# Patient Record
Sex: MALE | Race: WHITE
[De-identification: names, ages, dates, MRNs, and addresses within clinical notes are randomized per-mention and may not be internally consistent; named-entity substitution may affect disease eponyms.]

---

## 2018-09-08 ENCOUNTER — HOSPITAL ENCOUNTER (INPATIENT)
Dept: HOSPITAL 55 - ED | Age: 73
Discharge: HOME | DRG: 305 | End: 2018-09-08
Attending: FAMILY MEDICINE | Admitting: FAMILY MEDICINE
Payer: MEDICARE

## 2018-09-08 VITALS — HEART RATE: 70 BPM | RESPIRATION RATE: 26 BRPM

## 2018-09-08 VITALS — SYSTOLIC BLOOD PRESSURE: 130 MMHG | TEMPERATURE: 98 F | DIASTOLIC BLOOD PRESSURE: 76 MMHG

## 2018-09-08 VITALS — OXYGEN SATURATION: 93 %

## 2018-09-08 DIAGNOSIS — I48.91: ICD-10-CM

## 2018-09-08 DIAGNOSIS — R06.03: ICD-10-CM

## 2018-09-08 DIAGNOSIS — I10: ICD-10-CM

## 2018-09-08 DIAGNOSIS — J44.1: ICD-10-CM

## 2018-09-08 DIAGNOSIS — F06.4: ICD-10-CM

## 2018-09-08 DIAGNOSIS — R06.02: Primary | ICD-10-CM

## 2018-09-08 LAB
BASOPHILS NFR BLD AUTO: 1 % (ref 0–3)
BUN SERPL-MCNC: 20 MG/DL (ref 7–18)
CALCIUM SERPL-MCNC: 8 MG/DL (ref 8.5–10.1)
CHLORIDE SERPL-SCNC: 104 MMOL/L (ref 98–107)
CK SERPL-CCNC: 153 U/L (ref 39–308)
CO2 BLDA CALC-SCNC: 32 MEQ/L (ref 23–30)
CO2 SERPL-SCNC: 31.9 MEQ/L (ref 21–32)
CREAT SERPL-MCNC: 1.12 MG/DL (ref 0.8–1.3)
EOSINOPHIL NFR BLD AUTO: 0 % (ref 0–9)
GLUCOSE SERPL-MCNC: 122 MG/DL (ref 74–106)
HCO3 BLDA-SCNC: 31 MEQ/L (ref 23–30)
HCT VFR BLD CALC: 44 % (ref 39–53)
HGB BLD-MCNC: 14 GM/DL (ref 13.5–17.7)
INR PPP: 0.96 (ref 0.86–1.12)
LYMPHOCYTES NFR BLD AUTO: 18.5 % (ref 10–50)
MCH RBC QN AUTO: 28.1 PG (ref 27–32)
MCHC RBC AUTO-ENTMCNC: 31.7 GM/DL (ref 32–36)
MCV RBC AUTO: 89 FL (ref 80–100)
MONOCYTES NFR BLD AUTO: 8.8 % (ref 0–12)
NEUTROPHILS NFR BLD AUTO: 71.7 % (ref 37–80)
PCO2 BLDA: 55 MMHG (ref 35–45)
PH BLDA: 7.36 [PH] (ref 7.35–7.45)
PO2 BLDA: 54 MMHG (ref 80–100)
POTASSIUM SERPL-SCNC: 4.3 MMOL/L (ref 3.5–5.1)
SAO2 % BLDA FROM PO2: 85 % (ref 96–100)
SODIUM SERPL-SCNC: 142 MMOL/L (ref 136–145)
TROPONIN I SERPL-MCNC: 0.02 NG/ML (ref 0–0.06)

## 2018-09-08 PROCEDURE — 85730 THROMBOPLASTIN TIME PARTIAL: CPT

## 2018-09-08 PROCEDURE — 36600 WITHDRAWAL OF ARTERIAL BLOOD: CPT

## 2018-09-08 PROCEDURE — 82550 ASSAY OF CK (CPK): CPT

## 2018-09-08 PROCEDURE — 96374 THER/PROPH/DIAG INJ IV PUSH: CPT

## 2018-09-08 PROCEDURE — 94660 CPAP INITIATION&MGMT: CPT

## 2018-09-08 PROCEDURE — 94640 AIRWAY INHALATION TREATMENT: CPT

## 2018-09-08 PROCEDURE — 84484 ASSAY OF TROPONIN QUANT: CPT

## 2018-09-08 PROCEDURE — 85610 PROTHROMBIN TIME: CPT

## 2018-09-08 PROCEDURE — 83880 ASSAY OF NATRIURETIC PEPTIDE: CPT

## 2018-09-08 PROCEDURE — 96375 TX/PRO/DX INJ NEW DRUG ADDON: CPT

## 2018-09-08 PROCEDURE — 71045 X-RAY EXAM CHEST 1 VIEW: CPT

## 2018-09-08 PROCEDURE — 36415 COLL VENOUS BLD VENIPUNCTURE: CPT

## 2018-09-08 PROCEDURE — 93005 ELECTROCARDIOGRAM TRACING: CPT

## 2018-09-08 PROCEDURE — 99236 HOSP IP/OBS SAME DATE HI 85: CPT

## 2018-09-08 PROCEDURE — 94760 N-INVAS EAR/PLS OXIMETRY 1: CPT

## 2018-09-08 PROCEDURE — 85025 COMPLETE CBC W/AUTO DIFF WBC: CPT

## 2018-09-08 PROCEDURE — 82803 BLOOD GASES ANY COMBINATION: CPT

## 2018-09-08 PROCEDURE — 99285 EMERGENCY DEPT VISIT HI MDM: CPT

## 2018-09-08 PROCEDURE — 80048 BASIC METABOLIC PNL TOTAL CA: CPT

## 2018-09-08 RX ADMIN — METHYLPREDNISOLONE SODIUM SUCCINATE SCH MG: 125 INJECTION, POWDER, FOR SOLUTION INTRAMUSCULAR; INTRAVENOUS at 07:47

## 2018-09-08 RX ADMIN — IPRATROPIUM BROMIDE AND ALBUTEROL SULFATE PRN VIAL: .5; 3 SOLUTION RESPIRATORY (INHALATION) at 07:44

## 2018-09-08 RX ADMIN — IPRATROPIUM BROMIDE AND ALBUTEROL SULFATE PRN VIAL: .5; 3 SOLUTION RESPIRATORY (INHALATION) at 17:22

## 2018-09-08 RX ADMIN — IPRATROPIUM BROMIDE AND ALBUTEROL SULFATE PRN VIAL: .5; 3 SOLUTION RESPIRATORY (INHALATION) at 11:24

## 2018-09-08 RX ADMIN — METHYLPREDNISOLONE SODIUM SUCCINATE SCH MG: 125 INJECTION, POWDER, FOR SOLUTION INTRAMUSCULAR; INTRAVENOUS at 14:23

## 2022-01-24 ENCOUNTER — HOSPITAL ENCOUNTER (INPATIENT)
Facility: CLINIC | Age: 77
LOS: 2 days | Discharge: HOME | DRG: 199 | End: 2022-01-26
Attending: EMERGENCY MEDICINE | Admitting: HOSPITALIST
Payer: MEDICARE

## 2022-01-24 ENCOUNTER — APPOINTMENT (OUTPATIENT)
Dept: GENERAL RADIOLOGY | Facility: CLINIC | Age: 77
DRG: 199 | End: 2022-01-24
Attending: EMERGENCY MEDICINE
Payer: MEDICARE

## 2022-01-24 DIAGNOSIS — J95.811 PNEUMOTHORAX, POST BIOPSY, RIGHT: ICD-10-CM

## 2022-01-24 DIAGNOSIS — J96.21 ACUTE ON CHRONIC RESPIRATORY FAILURE WITH HYPOXIA (H): ICD-10-CM

## 2022-01-24 DIAGNOSIS — J44.1 COPD EXACERBATION (H): ICD-10-CM

## 2022-01-24 DIAGNOSIS — I48.0 PAROXYSMAL ATRIAL FIBRILLATION (H): Primary | ICD-10-CM

## 2022-01-24 PROBLEM — J96.00 ACUTE RESPIRATORY FAILURE (H): Status: ACTIVE | Noted: 2022-01-24

## 2022-01-24 LAB
ALBUMIN SERPL-MCNC: 3.7 G/DL (ref 3.4–5)
ALP SERPL-CCNC: 63 U/L (ref 40–150)
ALT SERPL W P-5'-P-CCNC: 42 U/L (ref 0–70)
ANION GAP SERPL CALCULATED.3IONS-SCNC: 1 MMOL/L (ref 3–14)
AST SERPL W P-5'-P-CCNC: 29 U/L (ref 0–45)
ATRIAL RATE - MUSE: 61 BPM
BASE EXCESS BLDV CALC-SCNC: 2.3 MMOL/L (ref -7.7–1.9)
BASOPHILS # BLD AUTO: 0.1 10E3/UL (ref 0–0.2)
BASOPHILS NFR BLD AUTO: 1 %
BILIRUB SERPL-MCNC: 0.4 MG/DL (ref 0.2–1.3)
BUN SERPL-MCNC: 19 MG/DL (ref 7–30)
CALCIUM SERPL-MCNC: 8.7 MG/DL (ref 8.5–10.1)
CHLORIDE BLD-SCNC: 108 MMOL/L (ref 94–109)
CO2 SERPL-SCNC: 31 MMOL/L (ref 20–32)
CREAT SERPL-MCNC: 0.98 MG/DL (ref 0.66–1.25)
DIASTOLIC BLOOD PRESSURE - MUSE: NORMAL MMHG
EOSINOPHIL # BLD AUTO: 0.2 10E3/UL (ref 0–0.7)
EOSINOPHIL NFR BLD AUTO: 2 %
ERYTHROCYTE [DISTWIDTH] IN BLOOD BY AUTOMATED COUNT: 14.4 % (ref 10–15)
FLUAV RNA SPEC QL NAA+PROBE: NEGATIVE
FLUBV RNA RESP QL NAA+PROBE: NEGATIVE
GFR SERPL CREATININE-BSD FRML MDRD: 80 ML/MIN/1.73M2
GLUCOSE BLD-MCNC: 124 MG/DL (ref 70–99)
GLUCOSE BLDC GLUCOMTR-MCNC: 86 MG/DL (ref 70–99)
HCO3 BLDV-SCNC: 32 MMOL/L (ref 21–28)
HCT VFR BLD AUTO: 41.4 % (ref 40–53)
HGB BLD-MCNC: 12.7 G/DL (ref 13.3–17.7)
HOLD SPECIMEN: NORMAL
IMM GRANULOCYTES # BLD: 0.1 10E3/UL
IMM GRANULOCYTES NFR BLD: 1 %
INR PPP: 0.93 (ref 0.85–1.15)
INTERPRETATION ECG - MUSE: NORMAL
LACTATE SERPL-SCNC: 0.7 MMOL/L (ref 0.7–2)
LYMPHOCYTES # BLD AUTO: 2.3 10E3/UL (ref 0.8–5.3)
LYMPHOCYTES NFR BLD AUTO: 25 %
MCH RBC QN AUTO: 30 PG (ref 26.5–33)
MCHC RBC AUTO-ENTMCNC: 30.7 G/DL (ref 31.5–36.5)
MCV RBC AUTO: 98 FL (ref 78–100)
MONOCYTES # BLD AUTO: 1.1 10E3/UL (ref 0–1.3)
MONOCYTES NFR BLD AUTO: 11 %
NEUTROPHILS # BLD AUTO: 5.5 10E3/UL (ref 1.6–8.3)
NEUTROPHILS NFR BLD AUTO: 60 %
NRBC # BLD AUTO: 0 10E3/UL
NRBC BLD AUTO-RTO: 0 /100
NT-PROBNP SERPL-MCNC: 776 PG/ML (ref 0–1800)
O2/TOTAL GAS SETTING VFR VENT: 100 %
OXYHGB MFR BLDV: 94 % (ref 70–75)
P AXIS - MUSE: NORMAL DEGREES
PCO2 BLDV: 75 MM HG (ref 40–50)
PH BLDV: 7.24 [PH] (ref 7.32–7.43)
PLATELET # BLD AUTO: 203 10E3/UL (ref 150–450)
PO2 BLDV: 85 MM HG (ref 25–47)
POTASSIUM BLD-SCNC: 4.5 MMOL/L (ref 3.4–5.3)
PR INTERVAL - MUSE: NORMAL MS
PROCALCITONIN SERPL-MCNC: <0.05 NG/ML
PROT SERPL-MCNC: 7.1 G/DL (ref 6.8–8.8)
QRS DURATION - MUSE: 142 MS
QT - MUSE: 396 MS
QTC - MUSE: 516 MS
R AXIS - MUSE: -16 DEGREES
RBC # BLD AUTO: 4.24 10E6/UL (ref 4.4–5.9)
SARS-COV-2 RNA RESP QL NAA+PROBE: NEGATIVE
SODIUM SERPL-SCNC: 140 MMOL/L (ref 133–144)
SYSTOLIC BLOOD PRESSURE - MUSE: NORMAL MMHG
T AXIS - MUSE: 93 DEGREES
TROPONIN I SERPL HS-MCNC: 38 NG/L
VENTRICULAR RATE- MUSE: 102 BPM
WBC # BLD AUTO: 9.2 10E3/UL (ref 4–11)

## 2022-01-24 PROCEDURE — 94660 CPAP INITIATION&MGMT: CPT

## 2022-01-24 PROCEDURE — 85610 PROTHROMBIN TIME: CPT | Performed by: EMERGENCY MEDICINE

## 2022-01-24 PROCEDURE — 250N000011 HC RX IP 250 OP 636: Performed by: HOSPITALIST

## 2022-01-24 PROCEDURE — 96375 TX/PRO/DX INJ NEW DRUG ADDON: CPT

## 2022-01-24 PROCEDURE — 87636 SARSCOV2 & INF A&B AMP PRB: CPT | Performed by: EMERGENCY MEDICINE

## 2022-01-24 PROCEDURE — C9803 HOPD COVID-19 SPEC COLLECT: HCPCS

## 2022-01-24 PROCEDURE — 83605 ASSAY OF LACTIC ACID: CPT | Performed by: EMERGENCY MEDICINE

## 2022-01-24 PROCEDURE — 84145 PROCALCITONIN (PCT): CPT | Performed by: EMERGENCY MEDICINE

## 2022-01-24 PROCEDURE — 250N000011 HC RX IP 250 OP 636

## 2022-01-24 PROCEDURE — 96374 THER/PROPH/DIAG INJ IV PUSH: CPT | Mod: 59

## 2022-01-24 PROCEDURE — 82040 ASSAY OF SERUM ALBUMIN: CPT | Performed by: EMERGENCY MEDICINE

## 2022-01-24 PROCEDURE — 85025 COMPLETE CBC W/AUTO DIFF WBC: CPT | Performed by: EMERGENCY MEDICINE

## 2022-01-24 PROCEDURE — 82805 BLOOD GASES W/O2 SATURATION: CPT | Performed by: EMERGENCY MEDICINE

## 2022-01-24 PROCEDURE — 5A09457 ASSISTANCE WITH RESPIRATORY VENTILATION, 24-96 CONSECUTIVE HOURS, CONTINUOUS POSITIVE AIRWAY PRESSURE: ICD-10-PCS | Performed by: EMERGENCY MEDICINE

## 2022-01-24 PROCEDURE — 96376 TX/PRO/DX INJ SAME DRUG ADON: CPT

## 2022-01-24 PROCEDURE — 94640 AIRWAY INHALATION TREATMENT: CPT

## 2022-01-24 PROCEDURE — 999N000065 XR CHEST PORT 1 VIEW

## 2022-01-24 PROCEDURE — 250N000011 HC RX IP 250 OP 636: Performed by: EMERGENCY MEDICINE

## 2022-01-24 PROCEDURE — 99285 EMERGENCY DEPT VISIT HI MDM: CPT | Mod: 25

## 2022-01-24 PROCEDURE — 36556 INSERT NON-TUNNEL CV CATH: CPT

## 2022-01-24 PROCEDURE — 84484 ASSAY OF TROPONIN QUANT: CPT | Performed by: EMERGENCY MEDICINE

## 2022-01-24 PROCEDURE — 999N000157 HC STATISTIC RCP TIME EA 10 MIN

## 2022-01-24 PROCEDURE — 250N000009 HC RX 250: Performed by: EMERGENCY MEDICINE

## 2022-01-24 PROCEDURE — 258N000003 HC RX IP 258 OP 636: Performed by: HOSPITALIST

## 2022-01-24 PROCEDURE — 94640 AIRWAY INHALATION TREATMENT: CPT | Mod: 76

## 2022-01-24 PROCEDURE — 93005 ELECTROCARDIOGRAM TRACING: CPT | Mod: 59

## 2022-01-24 PROCEDURE — 0W9930Z DRAINAGE OF RIGHT PLEURAL CAVITY WITH DRAINAGE DEVICE, PERCUTANEOUS APPROACH: ICD-10-PCS | Performed by: EMERGENCY MEDICINE

## 2022-01-24 PROCEDURE — 36415 COLL VENOUS BLD VENIPUNCTURE: CPT | Performed by: EMERGENCY MEDICINE

## 2022-01-24 PROCEDURE — 99223 1ST HOSP IP/OBS HIGH 75: CPT | Performed by: HOSPITALIST

## 2022-01-24 PROCEDURE — 120N000001 HC R&B MED SURG/OB

## 2022-01-24 PROCEDURE — 250N000009 HC RX 250: Performed by: HOSPITALIST

## 2022-01-24 PROCEDURE — 80053 COMPREHEN METABOLIC PANEL: CPT | Performed by: EMERGENCY MEDICINE

## 2022-01-24 PROCEDURE — 83880 ASSAY OF NATRIURETIC PEPTIDE: CPT | Performed by: EMERGENCY MEDICINE

## 2022-01-24 PROCEDURE — 71045 X-RAY EXAM CHEST 1 VIEW: CPT

## 2022-01-24 RX ORDER — ARFORMOTEROL TARTRATE 15 UG/2ML
15 SOLUTION RESPIRATORY (INHALATION) 2 TIMES DAILY
COMMUNITY

## 2022-01-24 RX ORDER — FENTANYL CITRATE 50 UG/ML
INJECTION, SOLUTION INTRAMUSCULAR; INTRAVENOUS
Status: COMPLETED
Start: 2022-01-24 | End: 2022-01-24

## 2022-01-24 RX ORDER — BUDESONIDE 0.5 MG/2ML
0.5 INHALANT ORAL 2 TIMES DAILY
Status: DISCONTINUED | OUTPATIENT
Start: 2022-01-24 | End: 2022-01-26 | Stop reason: HOSPADM

## 2022-01-24 RX ORDER — PANTOPRAZOLE SODIUM 40 MG/1
40 TABLET, DELAYED RELEASE ORAL DAILY
COMMUNITY

## 2022-01-24 RX ORDER — TAMSULOSIN HYDROCHLORIDE 0.4 MG/1
0.8 CAPSULE ORAL DAILY
COMMUNITY

## 2022-01-24 RX ORDER — LOSARTAN POTASSIUM 50 MG/1
50 TABLET ORAL DAILY
COMMUNITY

## 2022-01-24 RX ORDER — FINASTERIDE 5 MG/1
5 TABLET, FILM COATED ORAL DAILY
COMMUNITY

## 2022-01-24 RX ORDER — FENTANYL CITRATE 50 UG/ML
25 INJECTION, SOLUTION INTRAMUSCULAR; INTRAVENOUS ONCE
Status: COMPLETED | OUTPATIENT
Start: 2022-01-24 | End: 2022-01-24

## 2022-01-24 RX ORDER — LIDOCAINE 40 MG/G
CREAM TOPICAL
Status: DISCONTINUED | OUTPATIENT
Start: 2022-01-24 | End: 2022-01-24

## 2022-01-24 RX ORDER — FINASTERIDE 5 MG/1
5 TABLET, FILM COATED ORAL DAILY
Status: DISCONTINUED | OUTPATIENT
Start: 2022-01-25 | End: 2022-01-26 | Stop reason: HOSPADM

## 2022-01-24 RX ORDER — MULTIVIT WITH MINERALS/LUTEIN
1000 TABLET ORAL DAILY
COMMUNITY
End: 2022-01-24

## 2022-01-24 RX ORDER — AZITHROMYCIN 250 MG/1
250 TABLET, FILM COATED ORAL DAILY
COMMUNITY

## 2022-01-24 RX ORDER — GUAIFENESIN 600 MG/1
1200 TABLET, EXTENDED RELEASE ORAL DAILY
Status: DISCONTINUED | OUTPATIENT
Start: 2022-01-25 | End: 2022-01-26 | Stop reason: HOSPADM

## 2022-01-24 RX ORDER — IPRATROPIUM BROMIDE AND ALBUTEROL SULFATE 2.5; .5 MG/3ML; MG/3ML
3 SOLUTION RESPIRATORY (INHALATION) ONCE
Status: COMPLETED | OUTPATIENT
Start: 2022-01-24 | End: 2022-01-24

## 2022-01-24 RX ORDER — CARBOXYMETHYLCELLULOSE SODIUM 5 MG/ML
1 SOLUTION/ DROPS OPHTHALMIC
Status: DISCONTINUED | OUTPATIENT
Start: 2022-01-24 | End: 2022-01-26 | Stop reason: HOSPADM

## 2022-01-24 RX ORDER — LOSARTAN POTASSIUM 50 MG/1
50 TABLET ORAL DAILY
Status: DISCONTINUED | OUTPATIENT
Start: 2022-01-24 | End: 2022-01-26 | Stop reason: HOSPADM

## 2022-01-24 RX ORDER — IPRATROPIUM BROMIDE AND ALBUTEROL SULFATE 2.5; .5 MG/3ML; MG/3ML
1 SOLUTION RESPIRATORY (INHALATION) 4 TIMES DAILY
Status: DISCONTINUED | OUTPATIENT
Start: 2022-01-24 | End: 2022-01-26 | Stop reason: HOSPADM

## 2022-01-24 RX ORDER — FUROSEMIDE 20 MG
40 TABLET ORAL DAILY
COMMUNITY

## 2022-01-24 RX ORDER — MULTIVIT-MIN/IRON/FOLIC ACID/K 18-600-40
25 CAPSULE ORAL DAILY
COMMUNITY

## 2022-01-24 RX ORDER — NALOXONE HYDROCHLORIDE 0.4 MG/ML
0.4 INJECTION, SOLUTION INTRAMUSCULAR; INTRAVENOUS; SUBCUTANEOUS
Status: DISCONTINUED | OUTPATIENT
Start: 2022-01-24 | End: 2022-01-26 | Stop reason: HOSPADM

## 2022-01-24 RX ORDER — PREDNISONE 10 MG/1
10 TABLET ORAL DAILY
COMMUNITY

## 2022-01-24 RX ORDER — ARFORMOTEROL TARTRATE 15 UG/2ML
15 SOLUTION RESPIRATORY (INHALATION) 2 TIMES DAILY
Status: DISCONTINUED | OUTPATIENT
Start: 2022-01-24 | End: 2022-01-26 | Stop reason: HOSPADM

## 2022-01-24 RX ORDER — GUAIFENESIN 400 MG/1
400 TABLET ORAL EVERY EVENING
COMMUNITY

## 2022-01-24 RX ORDER — ONDANSETRON 4 MG/1
4 TABLET, ORALLY DISINTEGRATING ORAL EVERY 6 HOURS PRN
Status: DISCONTINUED | OUTPATIENT
Start: 2022-01-24 | End: 2022-01-26 | Stop reason: HOSPADM

## 2022-01-24 RX ORDER — HYDROMORPHONE HCL IN WATER/PF 6 MG/30 ML
.2-.4 PATIENT CONTROLLED ANALGESIA SYRINGE INTRAVENOUS EVERY 4 HOURS PRN
Status: DISCONTINUED | OUTPATIENT
Start: 2022-01-24 | End: 2022-01-26 | Stop reason: HOSPADM

## 2022-01-24 RX ORDER — FENTANYL CITRATE 50 UG/ML
25 INJECTION, SOLUTION INTRAMUSCULAR; INTRAVENOUS ONCE
Status: DISCONTINUED | OUTPATIENT
Start: 2022-01-24 | End: 2022-01-24

## 2022-01-24 RX ORDER — ACETAMINOPHEN 650 MG/1
650 SUPPOSITORY RECTAL EVERY 6 HOURS PRN
Status: DISCONTINUED | OUTPATIENT
Start: 2022-01-24 | End: 2022-01-26 | Stop reason: HOSPADM

## 2022-01-24 RX ORDER — FLUTICASONE PROPIONATE 50 MCG
2 SPRAY, SUSPENSION (ML) NASAL DAILY PRN
COMMUNITY

## 2022-01-24 RX ORDER — GABAPENTIN 300 MG/1
900 CAPSULE ORAL AT BEDTIME
COMMUNITY

## 2022-01-24 RX ORDER — ALBUTEROL SULFATE 0.83 MG/ML
2.5 SOLUTION RESPIRATORY (INHALATION) EVERY 6 HOURS PRN
COMMUNITY

## 2022-01-24 RX ORDER — IPRATROPIUM BROMIDE AND ALBUTEROL SULFATE 2.5; .5 MG/3ML; MG/3ML
1 SOLUTION RESPIRATORY (INHALATION) 4 TIMES DAILY
COMMUNITY

## 2022-01-24 RX ORDER — MORPHINE SULFATE 2 MG/ML
2 INJECTION, SOLUTION INTRAMUSCULAR; INTRAVENOUS ONCE
Status: COMPLETED | OUTPATIENT
Start: 2022-01-24 | End: 2022-01-24

## 2022-01-24 RX ORDER — LIDOCAINE 40 MG/G
CREAM TOPICAL
Status: DISCONTINUED | OUTPATIENT
Start: 2022-01-24 | End: 2022-01-26 | Stop reason: HOSPADM

## 2022-01-24 RX ORDER — MULTIVITAMIN,THERAPEUTIC
1 TABLET ORAL DAILY
COMMUNITY

## 2022-01-24 RX ORDER — ALLOPURINOL 300 MG/1
300 TABLET ORAL DAILY
COMMUNITY

## 2022-01-24 RX ORDER — FLUTICASONE PROPIONATE 50 MCG
2 SPRAY, SUSPENSION (ML) NASAL DAILY PRN
Status: DISCONTINUED | OUTPATIENT
Start: 2022-01-24 | End: 2022-01-26 | Stop reason: HOSPADM

## 2022-01-24 RX ORDER — NALOXONE HYDROCHLORIDE 0.4 MG/ML
0.2 INJECTION, SOLUTION INTRAMUSCULAR; INTRAVENOUS; SUBCUTANEOUS
Status: DISCONTINUED | OUTPATIENT
Start: 2022-01-24 | End: 2022-01-26 | Stop reason: HOSPADM

## 2022-01-24 RX ORDER — GABAPENTIN 300 MG/1
900 CAPSULE ORAL AT BEDTIME
Status: DISCONTINUED | OUTPATIENT
Start: 2022-01-24 | End: 2022-01-26 | Stop reason: HOSPADM

## 2022-01-24 RX ORDER — FERROUS SULFATE 325(65) MG
325 TABLET ORAL
COMMUNITY

## 2022-01-24 RX ORDER — ALLOPURINOL 300 MG/1
300 TABLET ORAL DAILY
Status: DISCONTINUED | OUTPATIENT
Start: 2022-01-24 | End: 2022-01-26 | Stop reason: HOSPADM

## 2022-01-24 RX ORDER — BISACODYL 10 MG
10 SUPPOSITORY, RECTAL RECTAL DAILY PRN
Status: DISCONTINUED | OUTPATIENT
Start: 2022-01-24 | End: 2022-01-26 | Stop reason: HOSPADM

## 2022-01-24 RX ORDER — ALBUTEROL SULFATE 90 UG/1
2 AEROSOL, METERED RESPIRATORY (INHALATION) EVERY 4 HOURS PRN
Status: DISCONTINUED | OUTPATIENT
Start: 2022-01-24 | End: 2022-01-26 | Stop reason: HOSPADM

## 2022-01-24 RX ORDER — METOPROLOL SUCCINATE 50 MG/1
50 TABLET, EXTENDED RELEASE ORAL DAILY
COMMUNITY

## 2022-01-24 RX ORDER — METOPROLOL SUCCINATE 50 MG/1
50 TABLET, EXTENDED RELEASE ORAL DAILY
Status: DISCONTINUED | OUTPATIENT
Start: 2022-01-25 | End: 2022-01-26 | Stop reason: HOSPADM

## 2022-01-24 RX ORDER — GUAIFENESIN 600 MG/1
1200 TABLET, EXTENDED RELEASE ORAL DAILY
COMMUNITY

## 2022-01-24 RX ORDER — ONDANSETRON 2 MG/ML
4 INJECTION INTRAMUSCULAR; INTRAVENOUS EVERY 6 HOURS PRN
Status: DISCONTINUED | OUTPATIENT
Start: 2022-01-24 | End: 2022-01-26 | Stop reason: HOSPADM

## 2022-01-24 RX ORDER — ALBUTEROL SULFATE 0.83 MG/ML
2.5 SOLUTION RESPIRATORY (INHALATION) EVERY 6 HOURS PRN
Status: DISCONTINUED | OUTPATIENT
Start: 2022-01-24 | End: 2022-01-26 | Stop reason: HOSPADM

## 2022-01-24 RX ORDER — TAMSULOSIN HYDROCHLORIDE 0.4 MG/1
0.8 CAPSULE ORAL DAILY
Status: DISCONTINUED | OUTPATIENT
Start: 2022-01-24 | End: 2022-01-26 | Stop reason: HOSPADM

## 2022-01-24 RX ORDER — FERROUS SULFATE 325(65) MG
325 TABLET ORAL
Status: DISCONTINUED | OUTPATIENT
Start: 2022-01-25 | End: 2022-01-26 | Stop reason: HOSPADM

## 2022-01-24 RX ORDER — ACETAMINOPHEN 325 MG/1
650 TABLET ORAL EVERY 6 HOURS PRN
Status: DISCONTINUED | OUTPATIENT
Start: 2022-01-24 | End: 2022-01-26 | Stop reason: HOSPADM

## 2022-01-24 RX ORDER — VITAMIN B COMPLEX
25 TABLET ORAL DAILY
Status: DISCONTINUED | OUTPATIENT
Start: 2022-01-24 | End: 2022-01-26 | Stop reason: HOSPADM

## 2022-01-24 RX ORDER — GUAIFENESIN 600 MG/1
600 TABLET, EXTENDED RELEASE ORAL EVERY EVENING
Status: DISCONTINUED | OUTPATIENT
Start: 2022-01-24 | End: 2022-01-26 | Stop reason: HOSPADM

## 2022-01-24 RX ORDER — PREDNISONE 5 MG/1
10 TABLET ORAL DAILY
Status: DISCONTINUED | OUTPATIENT
Start: 2022-01-24 | End: 2022-01-26 | Stop reason: HOSPADM

## 2022-01-24 RX ORDER — NITROGLYCERIN 0.4 MG/1
0.4 TABLET SUBLINGUAL EVERY 5 MIN PRN
Status: DISCONTINUED | OUTPATIENT
Start: 2022-01-24 | End: 2022-01-26 | Stop reason: HOSPADM

## 2022-01-24 RX ORDER — BUDESONIDE 0.5 MG/2ML
0.5 INHALANT ORAL 2 TIMES DAILY
COMMUNITY

## 2022-01-24 RX ORDER — SODIUM CHLORIDE 9 MG/ML
INJECTION, SOLUTION INTRAVENOUS CONTINUOUS
Status: DISCONTINUED | OUTPATIENT
Start: 2022-01-24 | End: 2022-01-25

## 2022-01-24 RX ORDER — PANTOPRAZOLE SODIUM 40 MG/1
40 TABLET, DELAYED RELEASE ORAL DAILY
Status: DISCONTINUED | OUTPATIENT
Start: 2022-01-25 | End: 2022-01-26 | Stop reason: HOSPADM

## 2022-01-24 RX ORDER — ALBUTEROL SULFATE 90 UG/1
2 AEROSOL, METERED RESPIRATORY (INHALATION) EVERY 4 HOURS PRN
COMMUNITY

## 2022-01-24 RX ADMIN — MORPHINE SULFATE 2 MG: 2 INJECTION, SOLUTION INTRAMUSCULAR; INTRAVENOUS at 18:10

## 2022-01-24 RX ADMIN — FENTANYL CITRATE 25 MCG: 50 INJECTION INTRAMUSCULAR; INTRAVENOUS at 15:32

## 2022-01-24 RX ADMIN — FENTANYL CITRATE 25 MCG: 50 INJECTION, SOLUTION INTRAMUSCULAR; INTRAVENOUS at 16:29

## 2022-01-24 RX ADMIN — BUDESONIDE 0.5 MG: 0.5 INHALANT RESPIRATORY (INHALATION) at 21:01

## 2022-01-24 RX ADMIN — IPRATROPIUM BROMIDE AND ALBUTEROL SULFATE 3 ML: .5; 3 SOLUTION RESPIRATORY (INHALATION) at 14:22

## 2022-01-24 RX ADMIN — IPRATROPIUM BROMIDE AND ALBUTEROL SULFATE 3 ML: .5; 3 SOLUTION RESPIRATORY (INHALATION) at 21:01

## 2022-01-24 RX ADMIN — ARFORMOTEROL TARTRATE 15 MCG: 15 SOLUTION RESPIRATORY (INHALATION) at 21:01

## 2022-01-24 RX ADMIN — SODIUM CHLORIDE: 9 INJECTION, SOLUTION INTRAVENOUS at 19:45

## 2022-01-24 RX ADMIN — MIDAZOLAM 2 MG: 1 INJECTION INTRAMUSCULAR; INTRAVENOUS at 14:54

## 2022-01-24 RX ADMIN — HYDROMORPHONE HYDROCHLORIDE 0.4 MG: 0.2 INJECTION, SOLUTION INTRAMUSCULAR; INTRAVENOUS; SUBCUTANEOUS at 22:02

## 2022-01-24 RX ADMIN — FENTANYL CITRATE 25 MCG: 50 INJECTION, SOLUTION INTRAMUSCULAR; INTRAVENOUS at 15:32

## 2022-01-24 RX ADMIN — MIDAZOLAM 2 MG: 1 INJECTION INTRAMUSCULAR; INTRAVENOUS at 15:27

## 2022-01-24 ASSESSMENT — ACTIVITIES OF DAILY LIVING (ADL)
ADLS_ACUITY_SCORE: 12
ADLS_ACUITY_SCORE: 8
ADLS_ACUITY_SCORE: 12
ADLS_ACUITY_SCORE: 6
ADLS_ACUITY_SCORE: 4
ADLS_ACUITY_SCORE: 12
ADLS_ACUITY_SCORE: 4

## 2022-01-24 ASSESSMENT — ENCOUNTER SYMPTOMS: SHORTNESS OF BREATH: 1

## 2022-01-24 ASSESSMENT — MIFFLIN-ST. JEOR
SCORE: 1886.12
SCORE: 1902.13

## 2022-01-24 NOTE — ED PROVIDER NOTES
History   Chief Complaint:  Shortness of Breath     HPI   Benji Adam is a 76 year old male with a history of severe COPD on continuous home oxygen and frequent BiPAP (17-20 hours/day), YADI, non small cell lung cancer s/p radiation in May (Dr. Mundo Bond and [facility]), and atrial fibrillation on Eliquis s/p pacemaker, CAD, hypertension, and hyperlipidemia who presents for evaluation of shortness of breath. The patient reports the onset of worsening shortness of breath this morning shortly before he was scheduled for a right lung biopsy. The patient was wheeled out to the car after the procedure which is a change from his baseline of ambulating independently. Per the patient's wife, the patient sat on his BiPAP and oxygen for awhile after the procedure. He also used a duoneb.He sat in the car to decide whether to go to the ED or if he was able to drive the hour back home.   His oxygen saturations were around 93%. They decided to come to the ED as his breathing was not improving. He had chest pain but states this has mostly resolved. The patient tested negative for COVID-19 on 01/20/2022, as part of his pre-op exam. He is fully vaccinated + boosted against COVID-19.     Review of Systems   Respiratory: Positive for shortness of breath.    Cardiovascular: Positive for chest pain (mostly resolved).   All other systems reviewed and are negative.        Allergies:  Simvastatin    Medications:  Albuterol   Flonase   Home oxygen   Mucinex   Pulmicort respules   Brovana   Proscar  Zyloprim   BiPAP   Flomax   Gabapentin  Lasix   Proventil   Duoneb   Eliquis   Toprol   Prednisone   Ferrous sulfate   Protonix   Cozaar    Past Medical History:    Malignant neoplasm of lung   Obesity   Pneumothorax of right lung after biopsy   Nodule of apex of right lung   COPD   Dual chamber pacemaker   Chronic respiratory failure   Elevated liver enzymes  Paroxysmal atrial fibrillation   Bilateral asymmetric sensorineural hearing lsos  "  Lumbar spinal stenosis   CKD, stage 3   Benign prostatic hypertrophy   Gout   AAA  Coronary artery disease   Hyperlipidemia   Hypertension  YADI  Colon polyp  Leukocytosis   Hyperkalemia   Unstable angina   Osteoarthritis   Alcohol dependence   Anemia  DJD  Skin cancer    Past Surgical History:    Hip replacement, left   Appendectomy   Colonoscopy x2  Cataract removal   Knee arthroscopy   Endovascular AAA repair   Stenting of left renal artery   IR angiogram   Lap cholecystectomy   Knee replacement   Transurethral resection of prostate     Family History:    Diabetes - father, grandchild   Cancer - mother   COPD - sister     Social History:  Presents to the ED: with his wife      Physical Exam     Patient Vitals for the past 24 hrs:   BP Temp Temp src Pulse Resp SpO2 Height Weight   01/24/22 1530 116/69 -- -- 80 -- 95 % -- --   01/24/22 1515 122/84 -- -- 87 -- 95 % -- --   01/24/22 1500 126/75 -- -- 91 -- 96 % -- --   01/24/22 1445 123/84 -- -- 89 -- 95 % -- --   01/24/22 1430 133/79 -- -- 92 16 96 % -- --   01/24/22 1415 -- -- -- 97 24 99 % -- --   01/24/22 1412 -- -- -- -- -- -- 1.803 m (5' 11\") 113.4 kg (250 lb)   01/24/22 1400 (!) 176/108 97  F (36.1  C) Temporal (!) 134 30 99 % -- --       Physical Exam  General: Patient is alert and dyspneic appearing.  HEENT: Head atraumatic    Eyes: pupils equal and reactive. Conjunctiva clear   Nares: patent   Oropharynx: no lesions, uvula midline, no palatal draping, normal voice, no trismus  Neck: Supple without lymphadenopathy, no meningismus  Chest: Tachycardic but regular  Lungs: Decreased breath sounds on the right, tachypnea, no wheezes or rales  Abdomen: Soft, non tender, nondistended, normal bowel sounds  Back: No costovertebral angle tenderness, no midline C, T or L spine tenderness  Neuro: Grossly nonfocal, normal speech, strength equal bilaterally, CN 2-12 intact  Extremities: No deformities, equal radial and DP pulses. No clubbing, cyanosis.  No edema  Skin: " Warm and dry with no rash.       Emergency Department Course     ECG:  ECG taken at 1401, ECG read at 1410  Ventricular-paced rhythm   Abnormal ECG  No significant change from previous on 12/22/2021.  Rate 102 bpm. NV interval * ms. QRS duration 142 ms. QT/QTc 396/516 ms. P-R-T axes * -16 93.    Imaging:  XR Chest Port 1 View   Final Result   IMPRESSION: Right chest tube has been repositioned with interval   decrease in size of a now small right pneumothorax.      ARACELI PENNY MD            SYSTEM ID:  [id number]      XR Chest Port 1 View   Final Result   IMPRESSION: New right chest tube is in place with tip in the pleural   space. Moderate right chest tube not appreciably changed.      ARACELI PENNY MD            SYSTEM ID:  [id number]      XR Chest Port 1 View   Final Result   IMPRESSION:Moderate right pneumothorax along the apex and lateral   right pleural space with about 4 cm pleural separation at the apex. No   mediastinal shift. The left lung is clear. Left subclavian transvenous   pacemaker. Normal heart size.      Findings were discussed with Dr. Jean-Baptiste at 2:30 PM.      SAMANTHA WOODY MD            SYSTEM ID:  [id number]          Report per radiology    Laboratory:  Labs Ordered and Resulted from Time of ED Arrival to Time of ED Departure   COMPREHENSIVE METABOLIC PANEL - Abnormal       Result Value    Sodium 140      Potassium 4.5      Chloride 108      Carbon Dioxide (CO2) 31      Anion Gap 1 (*)     Urea Nitrogen 19      Creatinine 0.98      Calcium 8.7      Glucose 124 (*)     Alkaline Phosphatase 63      AST 29      ALT 42      Protein Total 7.1      Albumin 3.7      Bilirubin Total 0.4      GFR Estimate 80     BLOOD GAS VENOUS WITH OXYHEMOGLOBIN - Abnormal    pH Venous 7.24 (*)     pCO2 Venous 75 (*)     pO2 Venous 85 (*)     Bicarbonate Venous 32 (*)     FIO2 100      Oxyhemoglobin Venous 94 (*)     Base Excess/Deficit (+/-) 2.3 (*)    CBC WITH PLATELETS AND DIFFERENTIAL - Abnormal    WBC Count 9.2       RBC Count 4.24 (*)     Hemoglobin 12.7 (*)     Hematocrit 41.4      MCV 98      MCH 30.0      MCHC 30.7 (*)     RDW 14.4      Platelet Count 203      % Neutrophils 60      % Lymphocytes 25      % Monocytes 11      % Eosinophils 2      % Basophils 1      % Immature Granulocytes 1      NRBCs per 100 WBC 0      Absolute Neutrophils 5.5      Absolute Lymphocytes 2.3      Absolute Monocytes 1.1      Absolute Eosinophils 0.2      Absolute Basophils 0.1      Absolute Immature Granulocytes 0.1      Absolute NRBCs 0.0     INFLUENZA A/B & SARS-COV2 PCR MULTIPLEX - Normal    Influenza A PCR Negative      Influenza B PCR Negative      SARS CoV2 PCR Negative     INR - Normal    INR 0.93     LACTIC ACID WHOLE BLOOD - Normal    Lactic Acid 0.7     TROPONIN I - Normal    Troponin I High Sensitivity 38     NT PROBNP INPATIENT - Normal    N terminal Pro BNP Inpatient 776     PROCALCITONIN       [facility]    -Chest Tube Insertion    Date/Time: 1/24/2022 3:52 PM  Performed by: Rea Jean-Baptiste MD  Authorized by: Rea Jean-Baptiste MD     Risks, benefits and alternatives discussed.      UNIVERSAL PROTOCOL   Site Marked: Yes  Prior Images Obtained and Reviewed:  Yes  Required items: Required blood products, implants, devices and special equipment available    Patient identity confirmed:  Arm band  NA - No sedation, light sedation, or local anesthesia  Confirmation Checklist:  Patient's identity using two indicators and correct equipment/implants were available  Universal Protocol: the Joint Commission Universal Protocol was followed    Preparation: Patient was prepped and draped in usual sterile fashion      PRE-PROCEDURE DETAILS:     Skin preparation:  ChloraPrep    ANESTHESIA (see MAR for exact dosages):     Anesthesia method:  Local infiltration    Local anesthetic:  Lidocaine 1% w/o epi        PROCEDURE DETAILS:     Placement location:  R lateral    Scalpel size:  10    Tube size (Fr):  16     Dissection instrument:  Finger and Deepali clamp    Ultrasound guidance: no      Tension pneumothorax: no      Tube connected to:  Suction and water seal    Drainage characteristics:  Air only    Suture material:  0 silk    Dressing:  4x4 sterile gauze and petrolatum-impregnated gauze        POST-PROCEDURE DETAILS:     Post-insertion x-ray findings: tube in good position      PROCEDURE  Describe Procedure: Patient was placed in a semirecumbent position with the head of the bed at 30 degree.  The side noted above was prepped and the patient was medicated as above. An incision was made and blunt dissection up and over the rib was performed. Access was then obtained to the pleural cavity.  Following placement of the tube there is no condensation of air in the tube and not much bubbling in the Pleur-evac.  X-ray was obtained revealed to be in good position but without complete resolution of the pneumothorax.  Using sterile technique and a new kit chest tube was removed.  New tube was inserted with good rush of air with Deepali clamp and condensation in the tube and appropriate bubbling in the Pleur-evac.  Chest tube was secured as above.  Repeat x-ray was obtained with improvement of the pneumothorax and good positioning  Patient Tolerance:  Patient tolerated the procedure well with no immediate complications        Emergency Department Course:    Reviewed:  I reviewed the patient's nursing notes, vitals and past medical history.     Assessments:  1405 I performed an exam of the patient in room ST02 as documented above.  1500 Chest tube insertion performed.   1557 Patient transferred from ST02 to ED15.  1800 Patient rechecked and updated.     Consults:   1425 I spoke with Dr. Morataya from radiology regarding patient's imaging revealing right pneumothorax.    1435 I consulted with Dr. Mikhail Mendoza from thoracic surgery.   1620 I spoke with Dr. Hurt of the hospitalist service regarding patient's presentation, findings, and  plan of care.  1851 Message left for patients wife with update    Interventions:  1422 Duoneb, 3 mL, nebulization   1454 Versed, 2 mg, IV  1527 Versed, 2 mg, IV   1532 Fentanyl, 25 mcg, IV     Disposition:  The patient was admitted to the hospital under the care of Dr. Hurt.     Impression & Plan     Medical Decision Making:  Benji Adam is a 76 year old male who presents to the emergency department today for evaluation of shortness of breath.  Patient with extensive COPD on BiPAP 17 to 20 hours a day as well as history of non-small cell lung cancer status post radiation who had a biopsy performed this morning.  Patient now with moderate sized pneumothorax.  Remainder of work-up is within acceptable limits.  I talked with Dr. Mendoza of thoracic surgery given the patient's diagnoses and requirements for BiPAP.  Recommended chest tube insertion.  Chest tube was placed as noted above.  Patient tolerated procedure well.  Vital signs have all stabilized.  He remains on home BiPAP settings.  Pain control with fentanyl.  Plan is for admission under Dr. Hurt with the hospitalist service and thoracic surgery consultation for chest tube.      Critical Care Time: was 30 minutes for this patient excluding procedures    Covid-19  Benji Adam was evaluated during a global COVID-19 pandemic, which necessitated consideration that the patient might be at risk for infection with the SARS-CoV-2 virus that causes COVID-19.   Applicable protocols for evaluation were followed during the patient's care.   COVID-19 was considered as part of the patient's evaluation. The plan for testing is:  a test was obtained during this visit.    Diagnosis:    ICD-10-CM    1. Pneumothorax, post biopsy, right  J95.811    2. COPD exacerbation (H)  J44.1    3. Acute on chronic respiratory failure with hypoxia (H)  J96.21        Scribe Disclosure:  Paige COLON, am serving as a scribe at 2:09 PM on 1/24/2022 to document services  personally performed by Rea Jean-Baptiste MD, based on my observations and the provider's statements to me.           Rea Jean-Baptiste MD  01/24/22 3517

## 2022-01-24 NOTE — H&P
[facility]    History and Physical - Hospitalist Service       Date of Admission:  1/24/2022    Assessment & Plan      Benji Adam is a 76 year old male with PMH significant for COPD and YADI, CKD stage III, BPH, obesity, AAA s/p fenestrated EVAR, CAD, gout, HL, HTN, non-small cell cancer of lung s/p radiation, basal cell cancer of right temple was sent to the ER after right lung biopsy and was found to have moderate right pneumothorax and is being admitted on 1/24/2022 for further management    Acute on chronic hypoxic respiratory failure  Acute right-sided pneumothorax, likely iatrogenic  History of non-small cell lung cancer of lung s/p radiation.  Was noted to have right upper lobe spiculated nodule which was increasing in size on CAT scan in November 2021 compared to June and September.  He underwent biopsy today.  Increasing shortness of breath after procedure.  Found to have right moderate pneumothorax.  Chest tube was placed in ER.  -Admit to inpatient  -Thoracic surgery consult, chest tube management per thoracic surgery  -Daily a.m. chest x-rays  -Continue BiPAP, wean as able.  Resume diet when able to come off BiPAP.      Chronic stable medical conditions: Once patient is off BiPAP, resume PTA medications when able  COPD and YADI: Patient uses BiPAP 16+ hours a day.  Given pneumothorax, unclear if he will tolerate being off BiPAP.  Wean off BiPAP as tolerated.  Continue PTA Pulmicort, Brovana, DuoNeb.  CKD stage II : Creatinine 0.98  BPH : Resume PTA tamsulosin and finasteride.  Obesity: Management per PCP  AAA s/p fenestrated EVAR  CAD, HL, HTN, paroxysmal A. fib: Resume PTA metoprolol XL.  Will change to IV if unable to come off BiPAP. Patient is on apixaban.  Will review with thoracic surgery and resume when able.  Hold PTA Lasix for now  Gout : Resume PTA allopurinol when able  Chronic anemia: Hb 11-12 at baseline  Non-small cell cancer of lung s/p radiation  Basal cell  "cancer of right temple : Patient was rescheduled to have excision done tomorrow 1/25.  Discussed with wife to cancel and reschedule the appointment.       Diet:  N.p.o. for now, okay for regular diet when able to come off BiPAP  DVT Prophylaxis: Pneumatic Compression Devices  [place] Catheter: Not present  Central Lines: None  Cardiac Monitoring: None  Code Status:  Full code, discussed with patient    Clinically Significant Risk Factors Present on Admission                # Obesity: last Body mass index is 34.87 kg/m .      Disposition Plan   Expected Discharge:  2-4 days once chest tube comes out and respiratory status near baseline.  Anticipated discharge location:  Awaiting care coordination huddle  Delays:              The patient's care was discussed with the Patient, Patient's Family and ER physician.    Robert Hurt MD  Hospitalist Service  [facility]  Securely message with the Vocera Web Console (learn more here)  Text page via [employer] Paging/Directory         ______________________________________________________________________    Chief Complaint   Increasing shortness of breath    History is obtained from the patient, chart review, discussion with wife and discussion with ER physician.    History of Present Illness   Benji Adam is a 76 year old male with medical history significant for COPD and YADI, CKD stage III, BPH, obesity, AAA s/p fenestrated EVAR, CAD, gout, HL, HTN, non-small cell cancer of lung s/p radiation, basal cell cancer of right temple was sent to ER for evaluation of shortness of breath.    Patient is known to have right upper lobe spiculated nodule which on follow-up CAT scan on November 2021 was increasing in size compared to June and September.  Patient had a biopsy scheduled today.  Patient was not \"feeling real good\" even before the procedure.  The lung biopsy was done around 10 AM and patient was becoming increasingly short of breath since the " "procedure.  Patient was told by the physician that there was \"air leak\" noted from procedure.  He has chronic cough but denies any increasing chest pain or cough prior to the procedure.  No fever.    In ER, patient was evaluated by Dr. Jean-Baptiste.  Chest x-ray showed moderate right pneumothorax.  Chest tube was placed.  Dr. Mendoza from thoracic surgery consulted, okay with continuing on BiPAP now that the chest tube is in place.  An admission was requested for further management.    Review of Systems    The 10 point Review of Systems is negative other than noted in the HPI or here.      Past Medical History    I have reviewed this patient's medical history and updated it with pertinent information if needed.     - COPD and YADI, CKD stage III, BPH, obesity, AAA s/p fenestrated EVAR, CAD, gout, HL, HTN, non-small cell cancer of lung s/p radiation, basal cell cancer of right temple    Past Surgical History   I have reviewed this patient's surgical history and updated it with pertinent information if needed.  No past surgical history on file.    Social History   I have reviewed this patient's social history and updated it with pertinent information if needed.  Social History     Tobacco Use     Smoking status: Not on file     Smokeless tobacco: Not on file   Substance Use Topics     Alcohol use: Not on file     Drug use: Not on file       Family History     Is reviewed, not contributory to his current presentation.    Prior to Admission Medications   None     Allergies   Allergies   Allergen Reactions     Simvastatin Other (See Comments)     Other reaction(s): Intolerance-Can't Take  Muscle Atrophy-Patient Reported  Muscle weakness, tightness         Physical Exam   Vital Signs: Temp: 97  F (36.1  C) Temp src: Temporal BP: 116/69 Pulse: 80   Resp: 16 SpO2: 95 % O2 Device: BiPAP/CPAP    Weight: 250 lbs 0 oz    General: AAOx3, very pleasant, on BiPAP but appears comfortable.  HEENT: PERRLA EOMI. Mucosa dry  Lungs: Breath " sounds are very diminished, on BiPAP with normal work of breathing.  No wheezing or crackles.  Chest tube on right with dressing over it.  Band-Aid over the chest posteriorly at the biopsy site.  CVS: S1S2 regular, no tachycardia or murmur.   Abdomen: Soft, obese/distended, nontender. BS heard.  MSK: No edema or deformities.  Neuro: AAOX3. CN 2-12 normal. Strength symmetrical.  Skin: No rash.       Data   Data reviewed today: I reviewed all medications, new labs and imaging results over the last 24 hours. I personally reviewed  multiple chest x-rays reviewed, initial cxr with moderate right pneumothorax, subsequently with chest tube in place with decreased size of the pneumothorax.  Twelve-lead EKG shows V paced rhythm    Recent Labs   Lab 01/24/22  1404   WBC 9.2   HGB 12.7*   MCV 98      INR 0.93      POTASSIUM 4.5   CHLORIDE 108   CO2 31   BUN 19   CR 0.98   ANIONGAP 1*   INDIRA 8.7   *   ALBUMIN 3.7   PROTTOTAL 7.1   BILITOTAL 0.4   ALKPHOS 63   ALT 42   AST 29     Recent Results (from the past 24 hour(s))   XR Chest Port 1 View    Narrative    XR CHEST PORT 1 VIEW 1/24/2022 2:17 PM    HISTORY: sob    COMPARISON: None.      Impression    IMPRESSION:Moderate right pneumothorax along the apex and lateral  right pleural space with about 4 cm pleural separation at the apex. No  mediastinal shift. The left lung is clear. Left subclavian transvenous  pacemaker. Normal heart size.    Findings were discussed with Dr. Jean-Baptiste at 2:30 PM.    SAMANTHA WOODY MD         SYSTEM ID:  [id number]   XR Chest Port 1 View    Narrative    XR CHEST PORT 1 VIEW  1/24/2022 3:31 PM       INDICATION: Post chest tube insertion  COMPARISON: 1/24/2022 at 1407 hours       Impression    IMPRESSION: New right chest tube is in place with tip in the pleural  space. Moderate right chest tube not appreciably changed.    ARACELI PENNY MD         SYSTEM ID:  [id number]   XR Chest Port 1 View    Narrative    XR CHEST PORT 1  VIEW  1/24/2022 3:45 PM       INDICATION: chest tube repositioning.  COMPARISON: 1/24/2022 at 1520 hours       Impression    IMPRESSION: Right chest tube has been repositioned with interval  decrease in size of a now small right pneumothorax.    ARACELI PENNY MD         SYSTEM ID:  [id number]

## 2022-01-24 NOTE — PHARMACY-ADMISSION MEDICATION HISTORY
Pharmacy Medication History  Admission medication history interview status for the 1/24/2022  admission is complete. See EPIC admission navigator for prior to admission medications     Location of Interview: Patient room  Medication history sources: Spoke w/ patient.  Reviewed information in Care Everywhere.     Significant changes made to the medication list:  - All medications added to list.      In the past week, patient estimated taking medication this percent of the time: greater than 90%    Additional medication history information:   - Patient has been holding PTA Apixaban for scheduled surgery that was scheduled for 1/25/22.      Medication reconciliation completed by provider prior to medication history? No    Time spent in this activity: 30 minutes    Prior to Admission medications    Medication Sig Last Dose Taking? Auth Provider   albuterol (PROAIR HFA/PROVENTIL HFA/VENTOLIN HFA) 108 (90 Base) MCG/ACT inhaler Inhale 2 puffs into the lungs every 4 hours as needed for shortness of breath / dyspnea or wheezing  at prn Yes Unknown, Entered By History   albuterol (PROVENTIL) (2.5 MG/3ML) 0.083% neb solution Take 2.5 mg by nebulization every 6 hours as needed for shortness of breath / dyspnea or wheezing  at prn Yes Unknown, Entered By History   allopurinol (ZYLOPRIM) 300 MG tablet Take 300 mg by mouth daily 1/23/2022 Yes Unknown, Entered By History   apixaban ANTICOAGULANT (ELIQUIS) 5 MG tablet Take 5 mg by mouth 2 times daily 1/21/2022 at pm Yes Unknown, Entered By History   arformoterol (BROVANA) 15 MCG/2ML NEBU neb solution Take 15 mcg by nebulization 2 times daily 1/24/2022 at am Yes Unknown, Entered By History   azithromycin (ZITHROMAX) 250 MG tablet Take 250 mg by mouth daily 1/23/2022 Yes Unknown, Entered By History   bismuth subsalicylate (PEPTO BISMOL) 262 MG/15ML suspension Take 15 mLs by mouth every 4 hours as needed for indigestion  at prn Yes Unknown, Entered By History   budesonide (PULMICORT)  0.5 MG/2ML neb solution Take 0.5 mg by nebulization 2 times daily 1/24/2022 at am Yes Unknown, Entered By History   ferrous sulfate (FEROSUL) 325 (65 Fe) MG tablet Take 325 mg by mouth daily (with breakfast) 1/23/2022 Yes Unknown, Entered By History   finasteride (PROSCAR) 5 MG tablet Take 5 mg by mouth daily 1/24/2022 at am Yes Unknown, Entered By History   fluticasone (FLONASE) 50 MCG/ACT nasal spray Spray 2 sprays into both nostrils daily as needed for rhinitis   at prn Yes Unknown, Entered By History   furosemide (LASIX) 20 MG tablet Take 40 mg by mouth daily  1/23/2022 at am Yes Unknown, Entered By History   gabapentin (NEURONTIN) 300 MG capsule Take 900 mg by mouth At Bedtime 1/23/2022 at hs Yes Unknown, Entered By History   guaiFENesin (MUCINEX) 600 MG 12 hr tablet Take 1,200 mg by mouth daily 1/24/2022 at am Yes Unknown, Entered By History   guaiFENesin 400 MG TABS Take 400 mg by mouth every evening 1/23/2022 at pm Yes Unknown, Entered By History   ipratropium - albuterol 0.5 mg/2.5 mg/3 mL (DUONEB) 0.5-2.5 (3) MG/3ML neb solution Take 1 vial by nebulization 4 times daily 1/24/2022 at am Yes Unknown, Entered By History   losartan (COZAAR) 50 MG tablet Take 50 mg by mouth daily 1/23/2022 Yes Unknown, Entered By History   metoprolol succinate ER (TOPROL-XL) 50 MG 24 hr tablet Take 50 mg by mouth daily 1/24/2022 at am Yes Unknown, Entered By History   multivitamin, therapeutic (THERA-VIT) TABS tablet Take 1 tablet by mouth daily 1/23/2022 at am Yes Unknown, Entered By History   pantoprazole (PROTONIX) 40 MG EC tablet Take 40 mg by mouth daily 1/24/2022 at am Yes Unknown, Entered By History   predniSONE (DELTASONE) 10 MG tablet Take 10 mg by mouth daily 1/23/2022 Yes Unknown, Entered By History   tamsulosin (FLOMAX) 0.4 MG capsule Take 0.8 mg by mouth daily  1/23/2022 Yes Unknown, Entered By History   vitamin D3 (CHOLECALCIFEROL) 250 mcg (23885 units) capsule Take 1 capsule by mouth daily 1/23/2022 Yes Unknown,  Entered By History       The information provided in this note is only as accurate as the sources available at the time of update(s)     Jada Galindo, [place], BCPS

## 2022-01-24 NOTE — PROGRESS NOTES
RT note:    Pt placed on Bipap: 16/8, 30%.  SpO2  96%.  RT to follow as needed.    Orin Schofield, RT'  2:56 PM 01/24/22

## 2022-01-25 ENCOUNTER — APPOINTMENT (OUTPATIENT)
Dept: GENERAL RADIOLOGY | Facility: CLINIC | Age: 77
DRG: 199 | End: 2022-01-25
Attending: THORACIC SURGERY (CARDIOTHORACIC VASCULAR SURGERY)
Payer: MEDICARE

## 2022-01-25 ENCOUNTER — APPOINTMENT (OUTPATIENT)
Dept: GENERAL RADIOLOGY | Facility: CLINIC | Age: 77
DRG: 199 | End: 2022-01-25
Attending: HOSPITALIST
Payer: MEDICARE

## 2022-01-25 LAB
ANION GAP SERPL CALCULATED.3IONS-SCNC: 2 MMOL/L (ref 3–14)
BASE EXCESS BLDA CALC-SCNC: 5.6 MMOL/L (ref -9–1.8)
BASOPHILS # BLD AUTO: 0 10E3/UL (ref 0–0.2)
BASOPHILS NFR BLD AUTO: 1 %
BUN SERPL-MCNC: 18 MG/DL (ref 7–30)
CALCIUM SERPL-MCNC: 8.3 MG/DL (ref 8.5–10.1)
CHLORIDE BLD-SCNC: 108 MMOL/L (ref 94–109)
CO2 SERPL-SCNC: 28 MMOL/L (ref 20–32)
CREAT SERPL-MCNC: 0.97 MG/DL (ref 0.66–1.25)
EOSINOPHIL # BLD AUTO: 0.1 10E3/UL (ref 0–0.7)
EOSINOPHIL NFR BLD AUTO: 2 %
ERYTHROCYTE [DISTWIDTH] IN BLOOD BY AUTOMATED COUNT: 14.2 % (ref 10–15)
GFR SERPL CREATININE-BSD FRML MDRD: 81 ML/MIN/1.73M2
GLUCOSE BLD-MCNC: 96 MG/DL (ref 70–99)
GLUCOSE BLDC GLUCOMTR-MCNC: 116 MG/DL (ref 70–99)
GLUCOSE BLDC GLUCOMTR-MCNC: 121 MG/DL (ref 70–99)
GLUCOSE BLDC GLUCOMTR-MCNC: 139 MG/DL (ref 70–99)
GLUCOSE BLDC GLUCOMTR-MCNC: 97 MG/DL (ref 70–99)
HCO3 BLD-SCNC: 33 MMOL/L (ref 21–28)
HCT VFR BLD AUTO: 35.4 % (ref 40–53)
HCT VFR BLD AUTO: 35.8 % (ref 40–53)
HGB BLD-MCNC: 10.9 G/DL (ref 13.3–17.7)
HGB BLD-MCNC: 11 G/DL (ref 13.3–17.7)
IMM GRANULOCYTES # BLD: 0.1 10E3/UL
IMM GRANULOCYTES NFR BLD: 1 %
LYMPHOCYTES # BLD AUTO: 1.2 10E3/UL (ref 0.8–5.3)
LYMPHOCYTES NFR BLD AUTO: 19 %
MCH RBC QN AUTO: 29.4 PG (ref 26.5–33)
MCHC RBC AUTO-ENTMCNC: 30.8 G/DL (ref 31.5–36.5)
MCV RBC AUTO: 95 FL (ref 78–100)
MONOCYTES # BLD AUTO: 0.8 10E3/UL (ref 0–1.3)
MONOCYTES NFR BLD AUTO: 13 %
NEUTROPHILS # BLD AUTO: 4 10E3/UL (ref 1.6–8.3)
NEUTROPHILS NFR BLD AUTO: 64 %
NRBC # BLD AUTO: 0 10E3/UL
NRBC BLD AUTO-RTO: 0 /100
O2/TOTAL GAS SETTING VFR VENT: 45 %
OXYHGB MFR BLD: 96 % (ref 92–100)
PCO2 BLD: 59 MM HG (ref 35–45)
PH BLD: 7.35 [PH] (ref 7.35–7.45)
PLATELET # BLD AUTO: 165 10E3/UL (ref 150–450)
PO2 BLD: 97 MM HG (ref 80–105)
POTASSIUM BLD-SCNC: 4.4 MMOL/L (ref 3.4–5.3)
RBC # BLD AUTO: 3.71 10E6/UL (ref 4.4–5.9)
SODIUM SERPL-SCNC: 138 MMOL/L (ref 133–144)
WBC # BLD AUTO: 6.2 10E3/UL (ref 4–11)

## 2022-01-25 PROCEDURE — 85025 COMPLETE CBC W/AUTO DIFF WBC: CPT | Performed by: HOSPITALIST

## 2022-01-25 PROCEDURE — 250N000009 HC RX 250: Performed by: HOSPITALIST

## 2022-01-25 PROCEDURE — 71045 X-RAY EXAM CHEST 1 VIEW: CPT | Mod: 77

## 2022-01-25 PROCEDURE — 94660 CPAP INITIATION&MGMT: CPT

## 2022-01-25 PROCEDURE — 71045 X-RAY EXAM CHEST 1 VIEW: CPT

## 2022-01-25 PROCEDURE — 94640 AIRWAY INHALATION TREATMENT: CPT | Mod: 76

## 2022-01-25 PROCEDURE — 250N000013 HC RX MED GY IP 250 OP 250 PS 637: Performed by: HOSPITALIST

## 2022-01-25 PROCEDURE — 250N000012 HC RX MED GY IP 250 OP 636 PS 637: Performed by: HOSPITALIST

## 2022-01-25 PROCEDURE — 120N000001 HC R&B MED SURG/OB

## 2022-01-25 PROCEDURE — 94640 AIRWAY INHALATION TREATMENT: CPT

## 2022-01-25 PROCEDURE — 999N000157 HC STATISTIC RCP TIME EA 10 MIN

## 2022-01-25 PROCEDURE — 99233 SBSQ HOSP IP/OBS HIGH 50: CPT | Performed by: HOSPITALIST

## 2022-01-25 PROCEDURE — 85014 HEMATOCRIT: CPT | Performed by: HOSPITALIST

## 2022-01-25 PROCEDURE — 250N000011 HC RX IP 250 OP 636: Performed by: HOSPITALIST

## 2022-01-25 PROCEDURE — 82310 ASSAY OF CALCIUM: CPT | Performed by: HOSPITALIST

## 2022-01-25 PROCEDURE — 82805 BLOOD GASES W/O2 SATURATION: CPT | Performed by: HOSPITALIST

## 2022-01-25 PROCEDURE — 36415 COLL VENOUS BLD VENIPUNCTURE: CPT | Performed by: HOSPITALIST

## 2022-01-25 RX ORDER — AZITHROMYCIN 250 MG/1
250 TABLET, FILM COATED ORAL DAILY
Status: DISCONTINUED | OUTPATIENT
Start: 2022-01-25 | End: 2022-01-26 | Stop reason: HOSPADM

## 2022-01-25 RX ORDER — FUROSEMIDE 40 MG
40 TABLET ORAL DAILY
Status: DISCONTINUED | OUTPATIENT
Start: 2022-01-26 | End: 2022-01-26 | Stop reason: HOSPADM

## 2022-01-25 RX ORDER — VITAMIN B COMPLEX
25 TABLET ORAL DAILY
Status: DISCONTINUED | OUTPATIENT
Start: 2022-01-25 | End: 2022-01-26 | Stop reason: HOSPADM

## 2022-01-25 RX ADMIN — OXYCODONE HYDROCHLORIDE 2.5 MG: 5 TABLET ORAL at 18:12

## 2022-01-25 RX ADMIN — ACETAMINOPHEN 650 MG: 325 TABLET, FILM COATED ORAL at 14:01

## 2022-01-25 RX ADMIN — GUAIFENESIN 1200 MG: 600 TABLET ORAL at 08:08

## 2022-01-25 RX ADMIN — OXYCODONE HYDROCHLORIDE 2.5 MG: 5 TABLET ORAL at 08:08

## 2022-01-25 RX ADMIN — AZITHROMYCIN MONOHYDRATE 250 MG: 250 TABLET ORAL at 12:52

## 2022-01-25 RX ADMIN — ARFORMOTEROL TARTRATE 15 MCG: 15 SOLUTION RESPIRATORY (INHALATION) at 08:10

## 2022-01-25 RX ADMIN — BUDESONIDE 0.5 MG: 0.5 INHALANT RESPIRATORY (INHALATION) at 08:10

## 2022-01-25 RX ADMIN — OXYCODONE HYDROCHLORIDE 2.5 MG: 5 TABLET ORAL at 00:20

## 2022-01-25 RX ADMIN — LOSARTAN POTASSIUM 50 MG: 50 TABLET, FILM COATED ORAL at 08:08

## 2022-01-25 RX ADMIN — Medication 25 MCG: at 08:07

## 2022-01-25 RX ADMIN — Medication 25 MCG: at 12:52

## 2022-01-25 RX ADMIN — HYDROMORPHONE HYDROCHLORIDE 0.4 MG: 0.2 INJECTION, SOLUTION INTRAMUSCULAR; INTRAVENOUS; SUBCUTANEOUS at 11:03

## 2022-01-25 RX ADMIN — IPRATROPIUM BROMIDE AND ALBUTEROL SULFATE 3 ML: .5; 3 SOLUTION RESPIRATORY (INHALATION) at 19:30

## 2022-01-25 RX ADMIN — ALLOPURINOL 300 MG: 300 TABLET ORAL at 08:09

## 2022-01-25 RX ADMIN — OXYCODONE HYDROCHLORIDE 2.5 MG: 5 TABLET ORAL at 12:52

## 2022-01-25 RX ADMIN — TAMSULOSIN HYDROCHLORIDE 0.8 MG: 0.4 CAPSULE ORAL at 08:08

## 2022-01-25 RX ADMIN — GUAIFENESIN 600 MG: 600 TABLET ORAL at 21:34

## 2022-01-25 RX ADMIN — PANTOPRAZOLE SODIUM 40 MG: 40 TABLET, DELAYED RELEASE ORAL at 08:08

## 2022-01-25 RX ADMIN — IPRATROPIUM BROMIDE AND ALBUTEROL SULFATE 3 ML: .5; 3 SOLUTION RESPIRATORY (INHALATION) at 12:01

## 2022-01-25 RX ADMIN — OXYCODONE HYDROCHLORIDE 2.5 MG: 5 TABLET ORAL at 04:26

## 2022-01-25 RX ADMIN — HYDROMORPHONE HYDROCHLORIDE 0.2 MG: 0.2 INJECTION, SOLUTION INTRAMUSCULAR; INTRAVENOUS; SUBCUTANEOUS at 15:56

## 2022-01-25 RX ADMIN — FINASTERIDE 5 MG: 5 TABLET, FILM COATED ORAL at 08:09

## 2022-01-25 RX ADMIN — HYDROMORPHONE HYDROCHLORIDE 0.4 MG: 0.2 INJECTION, SOLUTION INTRAMUSCULAR; INTRAVENOUS; SUBCUTANEOUS at 06:41

## 2022-01-25 RX ADMIN — ACETAMINOPHEN 650 MG: 325 TABLET, FILM COATED ORAL at 08:07

## 2022-01-25 RX ADMIN — ARFORMOTEROL TARTRATE 15 MCG: 15 SOLUTION RESPIRATORY (INHALATION) at 19:30

## 2022-01-25 RX ADMIN — ALBUTEROL SULFATE 2.5 MG: 2.5 SOLUTION RESPIRATORY (INHALATION) at 05:01

## 2022-01-25 RX ADMIN — IPRATROPIUM BROMIDE AND ALBUTEROL SULFATE 3 ML: .5; 3 SOLUTION RESPIRATORY (INHALATION) at 23:45

## 2022-01-25 RX ADMIN — METOPROLOL SUCCINATE 50 MG: 50 TABLET, EXTENDED RELEASE ORAL at 08:08

## 2022-01-25 RX ADMIN — PREDNISONE 10 MG: 5 TABLET ORAL at 08:08

## 2022-01-25 RX ADMIN — GABAPENTIN 900 MG: 300 CAPSULE ORAL at 21:34

## 2022-01-25 RX ADMIN — IPRATROPIUM BROMIDE AND ALBUTEROL SULFATE 3 ML: .5; 3 SOLUTION RESPIRATORY (INHALATION) at 15:48

## 2022-01-25 RX ADMIN — HYDROMORPHONE HYDROCHLORIDE 0.4 MG: 0.2 INJECTION, SOLUTION INTRAMUSCULAR; INTRAVENOUS; SUBCUTANEOUS at 02:05

## 2022-01-25 RX ADMIN — BUDESONIDE 0.5 MG: 0.5 INHALANT RESPIRATORY (INHALATION) at 19:29

## 2022-01-25 RX ADMIN — IPRATROPIUM BROMIDE AND ALBUTEROL SULFATE 3 ML: .5; 3 SOLUTION RESPIRATORY (INHALATION) at 08:14

## 2022-01-25 RX ADMIN — FERROUS SULFATE TAB 325 MG (65 MG ELEMENTAL FE) 325 MG: 325 (65 FE) TAB at 08:08

## 2022-01-25 ASSESSMENT — ACTIVITIES OF DAILY LIVING (ADL)
ADLS_ACUITY_SCORE: 8

## 2022-01-25 NOTE — PROGRESS NOTES
THORACIC SURGERY      76 yom    R ptx s/p Bx  CT in place  No air leak  No PTX on am CXR    CT to suction today  Clamp CT at midnight    Most likely D/C CT tomorrow     Discussed    TABITHA MÉNDEZ MD [facility] ONCOLOGY THORACIC SURGERY  CELL:  [phone]  OFFICE: [phone]

## 2022-01-25 NOTE — PROGRESS NOTES
[facility]    Medicine Progress Note - Hospitalist Service    Date of Admission:  1/24/2022    Assessment & Plan               Benji Adam is a 76 year old male with PMH significant for COPD and YAID, CKD stage III, BPH, obesity, AAA s/p fenestrated EVAR, CAD, gout, HL, HTN, non-small cell cancer of lung s/p radiation, basal cell cancer of right temple was sent to the ER after right lung biopsy and was found to have moderate right pneumothorax and is being admitted on 1/24/2022 for further management    Acute on chronic hypoxic respiratory failure  Acute right-sided pneumothorax, likely iatrogenic  History of non-small cell lung cancer of lung s/p radiation.  Was noted to have right upper lobe spiculated nodule which was increasing in size on CAT scan in November 2021 compared to June and September.  He underwent biopsy today.  Increasing shortness of breath after procedure.  Found to have right moderate pneumothorax.  Chest tube was placed in ER.     -Thoracic surgery consulted, discussed with Dr mendoza, since pneumothorax resolved on this am CXR, plan is to clamp chest tube at midnight and follow chest x-ray in a.m. If no pneumo after tube clamped, plan will be to take the tube out and discharge. Appreciate assistance.    -Daily a.m. chest x-rays  -Continue BiPAP PRN, he uses it 16 + hours a day  -encourage IS        Chronic stable medical conditions:     COPD and YADI: Patient uses BiPAP 16+ hours a day. BiPAP was continued despite the pneumothorax given chest tube in place after discussion with Dr. Mendoza. Pneumo has resolved and he has he is tolerating being off BiPAP.    - Continue PTA Pulmicort, Brovana, DuoNeb.  - He is on chronic low-dose prednisone and also chronic azithromycin 250 mg daily for suppression per his pulmonologist, continued  CKD stage II : Creatinine 0.98 at baseline  BPH : Resumed PTA tamsulosin and finasteride.  Obesity: Management per PCP  AAA s/p fenestrated  EVAR  CAD, HL, HTN, paroxysmal A. fib: Resumed PTA metoprolol XL.  - Patient is on apixaban. Discussed with Dr. Mendoza. To hold AC while Chest tube in place. OK to resume once CT removed. This was on hold prior to admission since he had excision of the right temple lesion planned 1/25 but that has been canceled since he is in hospital. Continue at discharge and hold once the procedure planned again.  - Resume PTA Lasix   Gout : Resumed PTA allopurinol    Chronic anemia: Hb 11-12 at baseline. Noted shight drop to 10 this am. Could be related to procedure/chest tube, monitor  Non-small cell cancer of lung s/p radiation  Basal cell cancer of right temple : Patient was scheduled to have excision done 1/25.  Discussed with wife and cancelled. She will call to reschedule the appointment.       Diet: Regular Diet Adult    DVT Prophylaxis: Pneumatic Compression Devices  [place] Catheter: Not present  Central Lines: None  Cardiac Monitoring: ACTIVE order. Indication: respiratory failure  Code Status: Full Code      Disposition Plan   Expected Discharge: 01/27/2022   Anticipated discharge location:  Awaiting care coordination huddle  Delays:              The patient's care was discussed with the Patient and Patient's Family. Discussed with Dr cynthia Hurt MD  Hospitalist Service  [facility]  Securely message with the Vocera Web Console (learn more here)  Text page via [email or web address] Paging/Directory         Clinically Significant Risk Factors Present on Admission           # Obesity: last Body mass index is 35.36 kg/m .      ______________________________________________________________________    Interval History      Evaluated patient this morning, was off BiPAP, reported his breathing was like his usual.  He reported not being able to take deep breaths due to chest pain at the tube site.  -no air leak on CT suction  -Chest x-ray in the morning showed complete resolution of the  pneumothorax.    Data reviewed today: I reviewed all medications, new labs and imaging results over the last 24 hours. I personally reviewed     Physical Exam   Vital Signs: Temp: 97.9  F (36.6  C) Temp src: Axillary BP: 129/68 Pulse: 73   Resp: 20 SpO2: 97 % O2 Device: BiPAP/CPAP Oxygen Delivery: 5 LPM (home device, baseline 3L)  Weight: 253 lbs 8.46 oz    General: AAOx3, very pleasant, on BiPAP but appears comfortable.  HEENT: PERRLA EOMI. Mucosa dry  Lungs: Breath sounds are in general diminished throughout both lungs, No wheezing or crackles.  Chest tube on right with dressing over it.  Band-Aid over the chest posteriorly at the biopsy site. Normal work of breathing.   CVS: S1S2 regular, no tachycardia or murmur.   Abdomen: Soft, obese/distended, nontender. BS heard.  MSK: No edema or deformities.  Neuro: AAOX3. CN 2-12 normal. Strength symmetrical.  Skin: No rash.     Data   Recent Labs   Lab 01/25/22  0806 01/25/22  0530 01/24/22  1957 01/24/22  1404   WBC  --  6.2  --  9.2   HGB  --  10.9*  --  12.7*   MCV  --  95  --  98   PLT  --  165  --  203   INR  --   --   --  0.93   NA  --  138  --  140   POTASSIUM  --  4.4  --  4.5   CHLORIDE  --  108  --  108   CO2  --  28  --  31   BUN  --  18  --  19   CR  --  0.97  --  0.98   ANIONGAP  --  2*  --  1*   INDIRA  --  8.3*  --  8.7   GLC 97 96 86 124*   ALBUMIN  --   --   --  3.7   PROTTOTAL  --   --   --  7.1   BILITOTAL  --   --   --  0.4   ALKPHOS  --   --   --  63   ALT  --   --   --  42   AST  --   --   --  29     Recent Results (from the past 24 hour(s))   XR Chest Port 1 View    Narrative    XR CHEST PORT 1 VIEW 1/24/2022 2:17 PM    HISTORY: sob    COMPARISON: None.      Impression    IMPRESSION:Moderate right pneumothorax along the apex and lateral  right pleural space with about 4 cm pleural separation at the apex. No  mediastinal shift. The left lung is clear. Left subclavian transvenous  pacemaker. Normal heart size.    Findings were discussed with Dr. Jean-Baptiste  at 2:30 PM.    SAMANTHA WOODY MD         SYSTEM ID:  [id number]   XR Chest Port 1 View    Narrative    XR CHEST PORT 1 VIEW  1/24/2022 3:31 PM       INDICATION: Post chest tube insertion  COMPARISON: 1/24/2022 at 1407 hours       Impression    IMPRESSION: New right chest tube is in place with tip in the pleural  space. Moderate right chest tube not appreciably changed.    ARACELI PENNY MD         SYSTEM ID:  [id number]   XR Chest Port 1 View    Narrative    XR CHEST PORT 1 VIEW  1/24/2022 3:45 PM       INDICATION: chest tube repositioning.  COMPARISON: 1/24/2022 at 1520 hours       Impression    IMPRESSION: Right chest tube has been repositioned with interval  decrease in size of a now small right pneumothorax.    ARACELI PENNY MD         SYSTEM ID:  [id number]   XR Chest Port 1 View    Narrative    EXAM: XR CHEST PORT 1 VIEW  LOCATION: [facility]  DATE/TIME: 1/25/2022 5:39 AM    INDICATION: folow u pneumothorax s/p chest tube  COMPARISON: 01/24/2022      Impression    IMPRESSION: Heart size within normal limits. Left-sided dual-chamber cardiac pacer. Patchy foci of atelectasis or airspace infiltrate in the right apex and right lateral lung base. Left lung is clear. No visible residual pneumothorax.     Medications     - MEDICATION INSTRUCTIONS -       - MEDICATION INSTRUCTIONS -       sodium chloride 50 mL/hr at 01/25/22 0900       allopurinol  300 mg Oral Daily     arformoterol  15 mcg Nebulization BID     budesonide  0.5 mg Nebulization BID     ferrous sulfate  325 mg Oral Daily with breakfast     finasteride  5 mg Oral Daily     gabapentin  900 mg Oral At Bedtime     guaiFENesin  1,200 mg Oral Daily     guaiFENesin  600 mg Oral QPM     ipratropium - albuterol 0.5 mg/2.5 mg/3 mL  1 vial Nebulization 4x Daily     losartan  50 mg Oral Daily     metoprolol succinate ER  50 mg Oral Daily     pantoprazole  40 mg Oral Daily     predniSONE  10 mg Oral Daily     sodium chloride (PF)  3 mL  Intracatheter Q8H     sodium chloride (PF)  3 mL Intracatheter Q8H     tamsulosin  0.8 mg Oral Daily     Vitamin D3  25 mcg Oral Daily

## 2022-01-25 NOTE — PROVIDER NOTIFICATION
MD Notification    Notified Person: MD    Notified Person Name: Yaa Sammy URIAS    Notification Date/Time: 1/25 1430    Notification Interaction: Text    Purpose of Notification: Chest tube came out.    Orders Received: Vaseline seal site & order portable CXR.     Repaged results, per PA okay to not replace CT.    Comments:

## 2022-01-25 NOTE — PLAN OF CARE
1/25 [id number] [place] [facility] [place]  A&Ox4, pleasant. VSS on home BiPAP, BL 3L, currently on 4L. NC 4L intermittently. SBA to bathroom. R lower chest pain, post CT pain, managed with Tylenol, Oxy, and Dilaudid. Regular diet, tolerating well with BG checks. Continent, -BM, +BS, +Flatus. K WDL. Daily CXR showed improvement, recheck showed small pneumothorax after CT came out accidentally this afternoon. Planned to have R CT removed tomorrow. Vaseline sealed dressing in place, per thoracic surgery okay to keep out. Scheduled nebs. Tele 100% V-Paced. BL neuropathy to bottoms of feet. R head lesion, planned to be removed as outpatient. PIV SL. Thoracic Surgery/RT/SW following. Patient may discharge tomorrow.

## 2022-01-25 NOTE — PLAN OF CARE
A&Ox4. VSS on bipap 30%FiO2 ex HTN overnight. Tele 100% V-paced. R Chest tube to -20suction, minimal red output. Pain managed with dilaudid and oxycodone. R chest dressing, CDI. PIV infusing. Voiding adequately per urinal. NPO on bipap.

## 2022-01-26 ENCOUNTER — APPOINTMENT (OUTPATIENT)
Dept: GENERAL RADIOLOGY | Facility: CLINIC | Age: 77
DRG: 199 | End: 2022-01-26
Attending: HOSPITALIST
Payer: MEDICARE

## 2022-01-26 VITALS
SYSTOLIC BLOOD PRESSURE: 123 MMHG | HEIGHT: 71 IN | WEIGHT: 260.58 LBS | TEMPERATURE: 98.3 F | BODY MASS INDEX: 36.48 KG/M2 | OXYGEN SATURATION: 95 % | HEART RATE: 70 BPM | RESPIRATION RATE: 18 BRPM | DIASTOLIC BLOOD PRESSURE: 69 MMHG

## 2022-01-26 LAB
GLUCOSE BLDC GLUCOMTR-MCNC: 128 MG/DL (ref 70–99)
POTASSIUM BLD-SCNC: 4.2 MMOL/L (ref 3.4–5.3)

## 2022-01-26 PROCEDURE — 250N000013 HC RX MED GY IP 250 OP 250 PS 637: Performed by: HOSPITALIST

## 2022-01-26 PROCEDURE — 94640 AIRWAY INHALATION TREATMENT: CPT

## 2022-01-26 PROCEDURE — 99239 HOSP IP/OBS DSCHRG MGMT >30: CPT | Performed by: INTERNAL MEDICINE

## 2022-01-26 PROCEDURE — 94640 AIRWAY INHALATION TREATMENT: CPT | Mod: 76

## 2022-01-26 PROCEDURE — 71045 X-RAY EXAM CHEST 1 VIEW: CPT

## 2022-01-26 PROCEDURE — 36415 COLL VENOUS BLD VENIPUNCTURE: CPT | Performed by: INTERNAL MEDICINE

## 2022-01-26 PROCEDURE — 250N000009 HC RX 250: Performed by: HOSPITALIST

## 2022-01-26 PROCEDURE — 84132 ASSAY OF SERUM POTASSIUM: CPT | Performed by: INTERNAL MEDICINE

## 2022-01-26 PROCEDURE — 999N000157 HC STATISTIC RCP TIME EA 10 MIN

## 2022-01-26 PROCEDURE — 250N000012 HC RX MED GY IP 250 OP 636 PS 637: Performed by: HOSPITALIST

## 2022-01-26 RX ORDER — OXYCODONE HYDROCHLORIDE 5 MG/1
2.5 TABLET ORAL EVERY 6 HOURS PRN
Qty: 5 TABLET | Refills: 0 | Status: SHIPPED | OUTPATIENT
Start: 2022-01-26

## 2022-01-26 RX ORDER — ACETAMINOPHEN 325 MG/1
650 TABLET ORAL EVERY 6 HOURS PRN
Qty: 60 TABLET | Refills: 0 | Status: SHIPPED | OUTPATIENT
Start: 2022-01-26

## 2022-01-26 RX ADMIN — METOPROLOL SUCCINATE 50 MG: 50 TABLET, EXTENDED RELEASE ORAL at 08:46

## 2022-01-26 RX ADMIN — ALBUTEROL SULFATE 2.5 MG: 2.5 SOLUTION RESPIRATORY (INHALATION) at 03:52

## 2022-01-26 RX ADMIN — TAMSULOSIN HYDROCHLORIDE 0.8 MG: 0.4 CAPSULE ORAL at 08:45

## 2022-01-26 RX ADMIN — LOSARTAN POTASSIUM 50 MG: 50 TABLET, FILM COATED ORAL at 08:45

## 2022-01-26 RX ADMIN — IPRATROPIUM BROMIDE AND ALBUTEROL SULFATE 3 ML: .5; 3 SOLUTION RESPIRATORY (INHALATION) at 07:11

## 2022-01-26 RX ADMIN — IPRATROPIUM BROMIDE AND ALBUTEROL SULFATE 3 ML: .5; 3 SOLUTION RESPIRATORY (INHALATION) at 10:42

## 2022-01-26 RX ADMIN — Medication 25 MCG: at 08:46

## 2022-01-26 RX ADMIN — ACETAMINOPHEN 650 MG: 325 TABLET, FILM COATED ORAL at 04:10

## 2022-01-26 RX ADMIN — FERROUS SULFATE TAB 325 MG (65 MG ELEMENTAL FE) 325 MG: 325 (65 FE) TAB at 08:45

## 2022-01-26 RX ADMIN — OXYCODONE HYDROCHLORIDE 5 MG: 5 TABLET ORAL at 09:53

## 2022-01-26 RX ADMIN — Medication 25 MCG: at 08:45

## 2022-01-26 RX ADMIN — GUAIFENESIN 1200 MG: 600 TABLET ORAL at 08:45

## 2022-01-26 RX ADMIN — FUROSEMIDE 40 MG: 40 TABLET ORAL at 08:45

## 2022-01-26 RX ADMIN — AZITHROMYCIN MONOHYDRATE 250 MG: 250 TABLET ORAL at 08:45

## 2022-01-26 RX ADMIN — PANTOPRAZOLE SODIUM 40 MG: 40 TABLET, DELAYED RELEASE ORAL at 08:45

## 2022-01-26 RX ADMIN — ACETAMINOPHEN 650 MG: 325 TABLET, FILM COATED ORAL at 11:12

## 2022-01-26 RX ADMIN — PREDNISONE 10 MG: 5 TABLET ORAL at 08:45

## 2022-01-26 RX ADMIN — OXYCODONE HYDROCHLORIDE 2.5 MG: 5 TABLET ORAL at 04:08

## 2022-01-26 RX ADMIN — BUDESONIDE 0.5 MG: 0.5 INHALANT RESPIRATORY (INHALATION) at 07:11

## 2022-01-26 RX ADMIN — ALLOPURINOL 300 MG: 300 TABLET ORAL at 08:46

## 2022-01-26 RX ADMIN — FINASTERIDE 5 MG: 5 TABLET, FILM COATED ORAL at 08:45

## 2022-01-26 ASSESSMENT — ACTIVITIES OF DAILY LIVING (ADL)
ADLS_ACUITY_SCORE: 8
DEPENDENT_IADLS:: INDEPENDENT
ADLS_ACUITY_SCORE: 8

## 2022-01-26 ASSESSMENT — MIFFLIN-ST. JEOR: SCORE: 1934.13

## 2022-01-26 NOTE — CONSULTS
Care Management Initial Consult    General Information  Assessment completed with: Patient,    Type of CM/SW Visit: Initial Assessment    Primary Care Provider verified and updated as needed: Yes (Dr Francesco Mandujano, Lino Friedman)   Readmission within the last 30 days:        Reason for Consult: discharge planning  Advance Care Planning: Advance Care Planning Reviewed: no concerns identified     General Information Comments: High readmision risk    Communication Assessment  Patient's communication style: spoken language (English or Bilingual)    Hearing Difficulty or Deaf: no   Wear Glasses or Blind: yes    Cognitive  Cognitive/Neuro/Behavioral: WDL                      Living Environment:   People in home: spouse     Current living Arrangements: house      Able to return to prior arrangements: yes  Living Arrangement Comments: Pt has four steps into his house.  He noted this is the most difficult task, getting back into his home    Family/Social Support:  Care provided by: self  Provides care for: no one  Marital Status:   Wife          Description of Support System: Involved         Current Resources:   Patient receiving home care services: No     Community Resources: None  Equipment currently used at home: hospital bed,other (see comments) (Nebulizer, Oxygen, Bipap)  Supplies currently used at home:      Employment/Financial:  Employment Status: retired     Employment/ Comments: Retired   Financial Concerns: No concerns identified           Functional Status:  Prior to admission patient needed assistance:   Dependent ADLs:: Independent  Dependent IADLs:: Independent       Mental Health Status:  Mental Health Status: No Current Concerns       Chemical Dependency Status:  Chemical Dependency Status: No Current Concerns             Values/Beliefs:  Spiritual, Cultural Beliefs, Gnosticist Practices, Values that affect care:    NA             Additional Information:  Pt is discharging  "home this AM.  He reports he is feeling his chest is \"a little tight, but I think I will be fine going home\".  Pt resides in [place] with his spouse.  His care team is:  PCP:Dr Francesco Mandujano [facility]  Pulmonologist: Dr Basurto at [facility] and is seen every three months  Oncologist: Dr Short [facility]  Confirmed diagnosis of non small cell lung ca was April 2021 and received radiation therapy.    Pt is on home Bipap 16hrs per day and is on nasal cannula O2 at 3L when off Bipap.  Pt noted he even drives with his Bipap at times.  Home O2 is through [employer].    In discussion with pt, have not identified any additional needs.  Pt does not feel he would benefit from having a home care nurse and is not home bound.    Mary Jaime RN   Inpatient Care Management  [phone]    Care Management Discharge Note    Discharge Date: 01/26/2022       Discharge Disposition:  Home    Discharge Services: None     Discharge DME: None     Discharge Transportation:  Pt's spouse will be providing transportation home    Private pay costs discussed: Not applicable    PAS Confirmation Code:  NA  Patient/family educated on Medicare website which has current facility and service quality ratings:  NA    Education Provided on the Discharge Plan:  yes  Persons Notified of Discharge Plans: Pt, his spouse and Nsg  Patient/Family in Agreement with the Plan:  yes    Handoff Referral Completed: yes    Additional Information:  Pt prefers to scheduled his PCP follow-up appointment.  His spouse will be here this AM to transport home.    Mary Jaime RN   Inpatient Care Management  [phone]          "

## 2022-01-26 NOTE — PROGRESS NOTES
Discharge education provided to patient. Patient verbalized understanding of discharge instructions and upcoming appointments. Patient left with all personal belongings-shoes found in patient's room, Wife and patient notified, arrangements for  have been made. Belongings currently left at nurses' station. Patient discharging to home and transportation provided by [facility].    Hard script for Oxycodone given to wife and patient.

## 2022-01-26 NOTE — PROGRESS NOTES
THORACIC SURGERY      CT came out yesterday afternoon    CXR this am: tiny residual apical PTX    Should resolve on its own    No indication for CT drainage at this time    TABITHA MÉNDEZ MD [facility] ONCOLOGY THORACIC SURGERY  CELL:  [phone]  OFFICE: [phone]

## 2022-01-26 NOTE — DISCHARGE SUMMARY
[facility]  Hospitalist Discharge Summary      Date of Admission:  1/24/2022  Date of Discharge:  1/26/2022  Discharging Provider: Amisha Grissom MD    Discharge Diagnoses    1. Right-sided pneumothorax s/p chest tube placement on 1/24/22  2. History of non-small cell lung cancer s/p biopsy on 1/24/22  3. Basal cell carcinoma, right temple   4. COPD  5. YADI  6. CKD stage II  7. BPH  8. AAA s/p fenestrated EVAR  9. CAD, native heart, native vessels  10. Essential hypertension  11. Paroxysmal atrial fibrillation  12. Gout  13. Chronic anemia    Follow-ups Needed After Discharge   Follow-up Appointments     Follow-up and recommended labs and tests       Follow up with primary care provider within 2 weeks for hospital   follow-up. No follow up labs or test are needed.           Discharge Disposition   Discharged to home  Condition at discharge: Stable    Hospital Course   Benji Adam is a 76 year old male with complex PMHx including CAD, HTN, COPD, YADI, AAA s/p fenestrated EVAR, non-small cell lung cancer s/p radiation, and basal cell cancer over the right temple who underwent a lung biopsy on 1/24/2022 for a growing RUL spiculated nodule, and was subsequently found to have a pneumothorax following the procedure. A chest tube was placed on arrival with improvement in shortness of breath, and actually fell out prior to discharge. CXR prior to discharge showed a tiny residual apical pneumothorax which will likely resolve on its own, per Thoracic Surgery    He is breathing comfortably on room air at the time of discharge, and his other chronic medical conditions have been stable. He has been advised to resume his home apixaban tomorrow (1/27), and will reschedule his appointment for BCC resection (which was originally scheduled on 1/25)     Consultations This Hospital Stay   THORACIC SURGERY IP CONSULT  SOCIAL WORK IP CONSULT  CARE MANAGEMENT / SOCIAL WORK IP CONSULT    Code Status   Full  Code    Time Spent on this Encounter   I, Amisha Grissom MD, personally saw the patient today and spent 35 minutes discharging this patient.       Amisha Grissom MD  [place] [address] [place] [address]  Phone: [phone]  ______________________________________________________________________    Physical Exam   Vital Signs: Temp: 98.3  F (36.8  C) Temp src: Axillary BP: 123/69 Pulse: 70   Resp: 18 SpO2: 95 % O2 Device: Nasal cannula Oxygen Delivery: 5 LPM  Weight: 260 lbs 9.34 oz    Constitutional: Resting in bed, NAD  HEENT: Sclera white, conjunctiva clear, EOMI, MMM  Respiratory: Breathing non-labored. Mildy coarse breath sounds bilaterally  Cardiovascular: Heart RRR, no m/r/g. No pedal edema.   GI: +BS. Abd soft/NT  Skin: Warm and dry. No rash.  Musculoskeletal: Normal muscle bulk and tone  Neurologic: Alert and appropriate. PRASAD  Psychiatric: Calm and cooperative    Primary Care Physician   Francesco Mandujano    Discharge Orders      Reason for your hospital stay    Pneumothorax which resolved upon chest tube placement     Follow-up and recommended labs and tests     Follow up with primary care provider within 2 weeks for hospital follow-up. No follow up labs or test are needed.     Activity    Your activity upon discharge: activity as tolerated     Diet    Follow this diet upon discharge: Regular diet       Significant Results and Procedures   Results for orders placed or performed during the hospital encounter of 01/24/22   XR Chest Port 1 View    Narrative    XR CHEST PORT 1 VIEW 1/24/2022 2:17 PM    HISTORY: sob    COMPARISON: None.      Impression    IMPRESSION:Moderate right pneumothorax along the apex and lateral  right pleural space with about 4 cm pleural separation at the apex. No  mediastinal shift. The left lung is clear. Left subclavian transvenous  pacemaker. Normal heart size.    Findings were discussed with Dr. Jean-Baptiste at 2:30 PM.    SAMANTHA CERVANTES  MD ANNALISE         SYSTEM ID:  [id number]   XR Chest Port 1 View    Narrative    XR CHEST PORT 1 VIEW  1/24/2022 3:31 PM       INDICATION: Post chest tube insertion  COMPARISON: 1/24/2022 at 1407 hours       Impression    IMPRESSION: New right chest tube is in place with tip in the pleural  space. Moderate right chest tube not appreciably changed.    ARACELI PENNY MD         SYSTEM ID:  [id number]   XR Chest Port 1 View    Narrative    XR CHEST PORT 1 VIEW  1/24/2022 3:45 PM       INDICATION: chest tube repositioning.  COMPARISON: 1/24/2022 at 1520 hours       Impression    IMPRESSION: Right chest tube has been repositioned with interval  decrease in size of a now small right pneumothorax.    ARACELI PENNY MD         SYSTEM ID:  [id number]   XR Chest Port 1 View    Narrative    EXAM: XR CHEST PORT 1 VIEW  LOCATION: [facility]  DATE/TIME: 1/25/2022 5:39 AM    INDICATION: folow u pneumothorax s/p chest tube  COMPARISON: 01/24/2022      Impression    IMPRESSION: Heart size within normal limits. Left-sided dual-chamber cardiac pacer. Patchy foci of atelectasis or airspace infiltrate in the right apex and right lateral lung base. Left lung is clear. No visible residual pneumothorax.   XR Chest Port 1 View    Narrative    XR CHEST PORT 1 VIEW 1/25/2022 3:04 PM    HISTORY: Chest tube came out accidentally    COMPARISON: Earlier today at 5:00 AM      Impression    IMPRESSION: Right-sided chest tube no longer visualized. New small  right lateral pneumothorax. Mild pulmonary vascular congestion. Left  subclavian transvenous pacemaker. No pleural effusion. Normal heart  size.    SAMANTHA WOODY MD         SYSTEM ID:  [id number]   XR Chest Port 1 View    Narrative    EXAM: XR CHEST PORT 1 VIEW  LOCATION: [facility]  DATE/TIME: 1/26/2022 4:40 AM    INDICATION: Follow-up pneumothorax, status post chest tube  COMPARISON: 01/25/2022      Impression    IMPRESSION: Left subclavian  approach pacing device. Small right apical pneumothorax. Slight right basilar atelectasis. No pleural effusion. Cardiac silhouette within normal limits. Mildly atherosclerotic aorta.       Discharge Medications   Discharge Medication List as of 1/26/2022 10:47 AM      START taking these medications    Details   acetaminophen (TYLENOL) 325 MG tablet Take 2 tablets (650 mg) by mouth every 6 hours as needed for mild pain or other (and adjunct with moderate or severe pain or per patient request), Disp-60 tablet, R-0, E-Prescribe      oxyCODONE (ROXICODONE) 5 MG tablet Take 0.5 tablets (2.5 mg) by mouth every 6 hours as needed for moderate to severe pain, Disp-5 tablet, R-0, Local Print         CONTINUE these medications which have CHANGED    Details   apixaban ANTICOAGULANT (ELIQUIS) 5 MG tablet Take 1 tablet (5 mg) by mouth 2 times daily, No Print Out         CONTINUE these medications which have NOT CHANGED    Details   albuterol (PROAIR HFA/PROVENTIL HFA/VENTOLIN HFA) 108 (90 Base) MCG/ACT inhaler Inhale 2 puffs into the lungs every 4 hours as needed for shortness of breath / dyspnea or wheezing, Historical      albuterol (PROVENTIL) (2.5 MG/3ML) 0.083% neb solution Take 2.5 mg by nebulization every 6 hours as needed for shortness of breath / dyspnea or wheezing, Historical      allopurinol (ZYLOPRIM) 300 MG tablet Take 300 mg by mouth daily, Historical      arformoterol (BROVANA) 15 MCG/2ML NEBU neb solution Take 15 mcg by nebulization 2 times daily, Historical      azithromycin (ZITHROMAX) 250 MG tablet Take 250 mg by mouth daily, Historical      bismuth subsalicylate (PEPTO BISMOL) 262 MG/15ML suspension Take 15 mLs by mouth every 4 hours as needed for indigestion, Historical      budesonide (PULMICORT) 0.5 MG/2ML neb solution Take 0.5 mg by nebulization 2 times daily, Historical      ferrous sulfate (FEROSUL) 325 (65 Fe) MG tablet Take 325 mg by mouth daily (with breakfast), Historical      finasteride (PROSCAR)  5 MG tablet Take 5 mg by mouth daily, Historical      fluticasone (FLONASE) 50 MCG/ACT nasal spray Spray 2 sprays into both nostrils daily as needed for rhinitis , Historical      furosemide (LASIX) 20 MG tablet Take 40 mg by mouth daily , Historical      gabapentin (NEURONTIN) 300 MG capsule Take 900 mg by mouth At Bedtime, Historical      guaiFENesin (MUCINEX) 600 MG 12 hr tablet Take 1,200 mg by mouth daily, Historical      guaiFENesin 400 MG TABS Take 400 mg by mouth every evening, Historical      ipratropium - albuterol 0.5 mg/2.5 mg/3 mL (DUONEB) 0.5-2.5 (3) MG/3ML neb solution Take 1 vial by nebulization 4 times daily, Historical      losartan (COZAAR) 50 MG tablet Take 50 mg by mouth daily, Historical      metoprolol succinate ER (TOPROL-XL) 50 MG 24 hr tablet Take 50 mg by mouth daily, Historical      multivitamin, therapeutic (THERA-VIT) TABS tablet Take 1 tablet by mouth daily, Historical      pantoprazole (PROTONIX) 40 MG EC tablet Take 40 mg by mouth daily, Historical      predniSONE (DELTASONE) 10 MG tablet Take 10 mg by mouth daily, Historical      tamsulosin (FLOMAX) 0.4 MG capsule Take 0.8 mg by mouth daily , Historical      Vitamin D, Cholecalciferol, 25 MCG (1000 UT) TABS Take 25 mcg by mouth daily, Historical         STOP taking these medications       vitamin D3 (CHOLECALCIFEROL) 250 mcg (50767 units) capsule Comments:   Reason for Stopping:             Allergies   Allergies   Allergen Reactions     Simvastatin Other (See Comments)     Other reaction(s): Intolerance-Can't Take  Muscle Atrophy-Patient Reported  Muscle weakness, tightness

## 2022-01-26 NOTE — PLAN OF CARE
"VSS. A/Ox4, able to express needs. On home CPAP throughout the night with 3-4L O2. Pain/discomfort managed with Oxycodone and Tylenol. Urinal at bedside. Will continue to monitor.     BP (!) 141/65   Pulse 71   Temp 97  F (36.1  C) (Axillary)   Resp 20   Ht 1.803 m (5' 11\")   Wt 115 kg (253 lb 8.5 oz)   SpO2 96%   BMI 35.36 kg/m       "

## 2022-01-27 ENCOUNTER — PATIENT OUTREACH (OUTPATIENT)
Dept: CARE COORDINATION | Facility: CLINIC | Age: 77
End: 2022-01-27
Payer: COMMERCIAL

## 2022-01-27 DIAGNOSIS — Z71.89 OTHER SPECIFIED COUNSELING: ICD-10-CM

## 2022-01-27 NOTE — PROGRESS NOTES
"Clinic Care Coordination Contact  [facility]: Post-Discharge Note  SITUATION                                                      Admission:    Admission Date: 01/24/22   Reason for Admission: SOB  Discharge:   Discharge Date: 01/26/22  Discharge Diagnosis: Right-sided pneumothorax s/p chest tube placement on 1/24/22    BACKGROUND                                                    Benji Adam is a 76 year old male with complex PMHx including CAD, HTN, COPD, YADI, AAA s/p fenestrated EVAR, non-small cell lung cancer s/p radiation, and basal cell cancer over the right temple who underwent a lung biopsy on 1/24/2022 for a growing RUL spiculated nodule, and was subsequently found to have a pneumothorax following the procedure. A chest tube was placed on arrival with improvement in shortness of breath, and actually fell out prior to discharge. CXR prior to discharge showed a tiny residual apical pneumothorax which will likely resolve on its own, per Thoracic Surgery     He is breathing comfortably on room air at the time of discharge, and his other chronic medical conditions have been stable. He has been advised to resume his home apixaban tomorrow (1/27), and will reschedule his appointment for BCC resection (which was originally scheduled on 1/25)       ASSESSMENT           Discharge Assessment  How are you doing now that you are home?: \" I am doing ok \"  How are your symptoms? (Red Flag symptoms escalate to triage hotline per guidelines): Improved  Do you feel your condition is stable enough to be safe at home until your provider visit?: Yes  Does the patient have their discharge instructions? : Yes  Does the patient have questions regarding their discharge instructions? : No  Were you started on any new medications or were there changes to any of your previous medications? : Yes  Does the patient have all of their medications?: Yes  Do you have questions regarding any of your medications? : No  Do you have " all of your needed medical supplies or equipment (DME)?  (i.e. oxygen tank, CPAP, cane, etc.): Yes  Discharge follow-up appointment scheduled within 14 calendar days? : Yes  Discharge Follow Up Appointment Date: 02/08/22  Discharge Follow Up Appointment Scheduled with?: Primary Care Provider    Post-op ([facility] Only)  If the patient had a surgery or procedure, do they have any questions for a nurse?: No             PLAN                                                      Outpatient Plan: Follow-up and recommended labs and tests       Follow up with primary care provider within 2 weeks for hospital   follow-up. No follow up labs or test are needed.       No future appointments.      For any urgent concerns, please contact our 24 hour nurse triage line: [phone])         Ana Luisa Ovalle